# Patient Record
Sex: FEMALE | Race: BLACK OR AFRICAN AMERICAN | Employment: FULL TIME | URBAN - METROPOLITAN AREA
[De-identification: names, ages, dates, MRNs, and addresses within clinical notes are randomized per-mention and may not be internally consistent; named-entity substitution may affect disease eponyms.]

---

## 2019-11-03 ENCOUNTER — HOSPITAL ENCOUNTER (EMERGENCY)
Age: 29
Discharge: HOME | End: 2019-11-03
Payer: COMMERCIAL

## 2019-11-03 VITALS
HEIGHT: 68 IN | DIASTOLIC BLOOD PRESSURE: 79 MMHG | HEART RATE: 88 BPM | TEMPERATURE: 98 F | RESPIRATION RATE: 18 BRPM | SYSTOLIC BLOOD PRESSURE: 119 MMHG | OXYGEN SATURATION: 98 % | BODY MASS INDEX: 27.28 KG/M2 | WEIGHT: 180 LBS

## 2019-11-03 DIAGNOSIS — J01.00 ACUTE NON-RECURRENT MAXILLARY SINUSITIS: Primary | ICD-10-CM

## 2019-11-03 PROCEDURE — 99282 EMERGENCY DEPT VISIT SF MDM: CPT

## 2019-11-03 RX ORDER — AMOXICILLIN AND CLAVULANATE POTASSIUM 875; 125 MG/1; MG/1
1 TABLET, FILM COATED ORAL 2 TIMES DAILY
Qty: 20 TABLET | Refills: 0 | Status: SHIPPED | OUTPATIENT
Start: 2019-11-03 | End: 2019-11-13

## 2019-11-03 SDOH — HEALTH STABILITY: MENTAL HEALTH: HOW OFTEN DO YOU HAVE A DRINK CONTAINING ALCOHOL?: NEVER

## 2019-11-03 ASSESSMENT — ENCOUNTER SYMPTOMS
WHEEZING: 0
ABDOMINAL PAIN: 0
TROUBLE SWALLOWING: 0
SINUS PRESSURE: 1
SORE THROAT: 0
COUGH: 0
DIARRHEA: 0
NAUSEA: 0
BACK PAIN: 0
SHORTNESS OF BREATH: 0
RHINORRHEA: 1
VOMITING: 0

## 2020-09-23 ENCOUNTER — HOSPITAL ENCOUNTER (OUTPATIENT)
Dept: ULTRASOUND IMAGING | Age: 30
Discharge: HOME | End: 2020-09-25
Payer: COMMERCIAL

## 2020-09-23 PROCEDURE — 76801 OB US < 14 WKS SINGLE FETUS: CPT

## 2020-09-23 PROCEDURE — 76802 OB US < 14 WKS ADDL FETUS: CPT

## 2020-11-11 ENCOUNTER — APPOINTMENT (OUTPATIENT)
Dept: ULTRASOUND IMAGING | Age: 30
End: 2020-11-11
Payer: COMMERCIAL

## 2020-11-11 ENCOUNTER — HOSPITAL ENCOUNTER (OUTPATIENT)
Age: 30
Discharge: HOME | End: 2020-11-11
Attending: OBSTETRICS & GYNECOLOGY | Admitting: OBSTETRICS & GYNECOLOGY
Payer: COMMERCIAL

## 2020-11-11 VITALS
TEMPERATURE: 99.2 F | DIASTOLIC BLOOD PRESSURE: 55 MMHG | RESPIRATION RATE: 18 BRPM | HEART RATE: 102 BPM | SYSTOLIC BLOOD PRESSURE: 98 MMHG

## 2020-11-11 LAB
ALBUMIN SERPL-MCNC: 3.4 G/DL (ref 3.5–4.6)
ALP BLD-CCNC: 67 U/L (ref 40–130)
ALT SERPL-CCNC: 14 U/L (ref 0–33)
AMPHETAMINE SCREEN, URINE: ABNORMAL
ANION GAP SERPL CALCULATED.3IONS-SCNC: 10 MEQ/L (ref 9–15)
AST SERPL-CCNC: 18 U/L (ref 0–35)
BACTERIA: ABNORMAL /HPF
BARBITURATE SCREEN URINE: ABNORMAL
BASOPHILS ABSOLUTE: 0 K/UL (ref 0–0.2)
BASOPHILS RELATIVE PERCENT: 0.1 %
BENZODIAZEPINE SCREEN, URINE: ABNORMAL
BILIRUB SERPL-MCNC: 1.3 MG/DL (ref 0.2–0.7)
BILIRUBIN URINE: NEGATIVE
BLOOD, URINE: ABNORMAL
BUN BLDV-MCNC: 4 MG/DL (ref 6–20)
CALCIUM SERPL-MCNC: 8.9 MG/DL (ref 8.5–9.9)
CANNABINOID SCREEN URINE: POSITIVE
CHLORIDE BLD-SCNC: 104 MEQ/L (ref 95–107)
CLARITY: ABNORMAL
CLUE CELLS: ABNORMAL
CO2: 20 MEQ/L (ref 20–31)
COCAINE METABOLITE SCREEN URINE: ABNORMAL
COLOR: ABNORMAL
CREAT SERPL-MCNC: 0.48 MG/DL (ref 0.5–0.9)
EOSINOPHILS ABSOLUTE: 0 K/UL (ref 0–0.7)
EOSINOPHILS RELATIVE PERCENT: 0.1 %
EPITHELIAL CELLS, UA: ABNORMAL /HPF (ref 0–5)
GFR AFRICAN AMERICAN: >60
GFR NON-AFRICAN AMERICAN: >60
GLOBULIN: 2.9 G/DL (ref 2.3–3.5)
GLUCOSE BLD-MCNC: 98 MG/DL (ref 70–99)
GLUCOSE URINE: NEGATIVE MG/DL
HCT VFR BLD CALC: 31.5 % (ref 37–47)
HEMOGLOBIN: 10.5 G/DL (ref 12–16)
HYALINE CASTS: ABNORMAL /HPF (ref 0–5)
KETONES, URINE: >=80 MG/DL
LEUKOCYTE ESTERASE, URINE: ABNORMAL
LYMPHOCYTES ABSOLUTE: 1 K/UL (ref 1–4.8)
LYMPHOCYTES RELATIVE PERCENT: 9.3 %
Lab: ABNORMAL
MCH RBC QN AUTO: 33.9 PG (ref 27–31.3)
MCHC RBC AUTO-ENTMCNC: 33.3 % (ref 33–37)
MCV RBC AUTO: 101.8 FL (ref 82–100)
METHADONE SCREEN, URINE: ABNORMAL
MONOCYTES ABSOLUTE: 0.9 K/UL (ref 0.2–0.8)
MONOCYTES RELATIVE PERCENT: 8.5 %
NEUTROPHILS ABSOLUTE: 9 K/UL (ref 1.4–6.5)
NEUTROPHILS RELATIVE PERCENT: 82 %
NITRITE, URINE: NEGATIVE
OPIATE SCREEN URINE: ABNORMAL
OXYCODONE URINE: ABNORMAL
PDW BLD-RTO: 13.4 % (ref 11.5–14.5)
PH UA: 6 (ref 5–9)
PHENCYCLIDINE SCREEN URINE: ABNORMAL
PLATELET # BLD: 259 K/UL (ref 130–400)
POTASSIUM SERPL-SCNC: 3.4 MEQ/L (ref 3.4–4.9)
PROPOXYPHENE SCREEN: ABNORMAL
PROTEIN UA: 100 MG/DL
RBC # BLD: 3.09 M/UL (ref 4.2–5.4)
RBC UA: ABNORMAL /HPF (ref 0–5)
SODIUM BLD-SCNC: 134 MEQ/L (ref 135–144)
SPECIFIC GRAVITY UA: 1.01 (ref 1–1.03)
TOTAL PROTEIN: 6.3 G/DL (ref 6.3–8)
TRICHOMONAS PREP: ABNORMAL
TRICHOMONAS VAGINALIS SCREEN: NEGATIVE
UROBILINOGEN, URINE: 0.2 E.U./DL
WBC # BLD: 11 K/UL (ref 4.8–10.8)
WBC UA: >100 /HPF (ref 0–5)
YEAST WET PREP: ABNORMAL

## 2020-11-11 PROCEDURE — 99284 EMERGENCY DEPT VISIT MOD MDM: CPT

## 2020-11-11 PROCEDURE — 87186 SC STD MICRODIL/AGAR DIL: CPT

## 2020-11-11 PROCEDURE — 85025 COMPLETE CBC W/AUTO DIFF WBC: CPT

## 2020-11-11 PROCEDURE — 36415 COLL VENOUS BLD VENIPUNCTURE: CPT

## 2020-11-11 PROCEDURE — 87210 SMEAR WET MOUNT SALINE/INK: CPT

## 2020-11-11 PROCEDURE — 76815 OB US LIMITED FETUS(S): CPT

## 2020-11-11 PROCEDURE — 87077 CULTURE AEROBIC IDENTIFY: CPT

## 2020-11-11 PROCEDURE — 80053 COMPREHEN METABOLIC PANEL: CPT

## 2020-11-11 PROCEDURE — 99283 EMERGENCY DEPT VISIT LOW MDM: CPT | Performed by: OBSTETRICS & GYNECOLOGY

## 2020-11-11 PROCEDURE — 6370000000 HC RX 637 (ALT 250 FOR IP): Performed by: OBSTETRICS & GYNECOLOGY

## 2020-11-11 PROCEDURE — 87086 URINE CULTURE/COLONY COUNT: CPT

## 2020-11-11 PROCEDURE — 80307 DRUG TEST PRSMV CHEM ANLYZR: CPT

## 2020-11-11 PROCEDURE — 96365 THER/PROPH/DIAG IV INF INIT: CPT

## 2020-11-11 PROCEDURE — 2580000003 HC RX 258: Performed by: OBSTETRICS & GYNECOLOGY

## 2020-11-11 PROCEDURE — 6360000002 HC RX W HCPCS: Performed by: OBSTETRICS & GYNECOLOGY

## 2020-11-11 PROCEDURE — 87808 TRICHOMONAS ASSAY W/OPTIC: CPT

## 2020-11-11 PROCEDURE — 76817 TRANSVAGINAL US OBSTETRIC: CPT

## 2020-11-11 PROCEDURE — 76775 US EXAM ABDO BACK WALL LIM: CPT

## 2020-11-11 PROCEDURE — 81001 URINALYSIS AUTO W/SCOPE: CPT

## 2020-11-11 RX ORDER — ACETAMINOPHEN 325 MG/1
650 TABLET ORAL EVERY 4 HOURS PRN
Status: DISCONTINUED | OUTPATIENT
Start: 2020-11-11 | End: 2020-11-11 | Stop reason: HOSPADM

## 2020-11-11 RX ORDER — SODIUM CHLORIDE, SODIUM LACTATE, POTASSIUM CHLORIDE, CALCIUM CHLORIDE 600; 310; 30; 20 MG/100ML; MG/100ML; MG/100ML; MG/100ML
INJECTION, SOLUTION INTRAVENOUS CONTINUOUS
Status: DISCONTINUED | OUTPATIENT
Start: 2020-11-11 | End: 2020-11-11 | Stop reason: HOSPADM

## 2020-11-11 RX ORDER — NITROFURANTOIN 25; 75 MG/1; MG/1
100 CAPSULE ORAL 2 TIMES DAILY
Qty: 20 CAPSULE | Refills: 0 | Status: SHIPPED | OUTPATIENT
Start: 2020-11-11 | End: 2020-11-21

## 2020-11-11 RX ORDER — PROMETHAZINE HYDROCHLORIDE 12.5 MG/1
12.5 TABLET ORAL EVERY 6 HOURS PRN
Status: DISCONTINUED | OUTPATIENT
Start: 2020-11-11 | End: 2020-11-11 | Stop reason: HOSPADM

## 2020-11-11 RX ORDER — ONDANSETRON 2 MG/ML
4 INJECTION INTRAMUSCULAR; INTRAVENOUS EVERY 6 HOURS PRN
Status: DISCONTINUED | OUTPATIENT
Start: 2020-11-11 | End: 2020-11-11 | Stop reason: HOSPADM

## 2020-11-11 RX ORDER — SODIUM CHLORIDE 0.9 % (FLUSH) 0.9 %
10 SYRINGE (ML) INJECTION EVERY 12 HOURS SCHEDULED
Status: DISCONTINUED | OUTPATIENT
Start: 2020-11-11 | End: 2020-11-11 | Stop reason: HOSPADM

## 2020-11-11 RX ORDER — SODIUM CHLORIDE 0.9 % (FLUSH) 0.9 %
10 SYRINGE (ML) INJECTION PRN
Status: DISCONTINUED | OUTPATIENT
Start: 2020-11-11 | End: 2020-11-11 | Stop reason: HOSPADM

## 2020-11-11 RX ORDER — ASPIRIN 81 MG/1
81 TABLET, CHEWABLE ORAL DAILY
Status: ON HOLD | COMMUNITY
End: 2021-02-22 | Stop reason: HOSPADM

## 2020-11-11 RX ADMIN — SODIUM CHLORIDE, POTASSIUM CHLORIDE, SODIUM LACTATE AND CALCIUM CHLORIDE 999 ML/HR: 600; 310; 30; 20 INJECTION, SOLUTION INTRAVENOUS at 11:11

## 2020-11-11 RX ADMIN — CEFAZOLIN 1 G: 1 INJECTION, POWDER, FOR SOLUTION INTRAMUSCULAR; INTRAVENOUS at 11:11

## 2020-11-11 RX ADMIN — ACETAMINOPHEN 650 MG: 325 TABLET, FILM COATED ORAL at 11:11

## 2020-11-11 ASSESSMENT — PAIN SCALES - GENERAL: PAINLEVEL_OUTOF10: 8

## 2020-11-11 NOTE — FLOWSHEET NOTE
Dr. Ignacio Block currently on phone with Dr. Pamila Epley - has reviewed all labs. Pain remains at \"6\", patient sleeping and wakes when nurse enters room.

## 2020-11-11 NOTE — ED TRIAGE NOTES
Department of Obstetrics and Gynecology  Labor and Delivery   Triage Note      Pt Name: Thea Alvarado  MRN: [id number] Rob #: [de-identified]  YOB: 1990  Procedure Performed By: José Miguel Cannon MD        SUBJECTIVE:  32 y/o  female with twin gest at 22 wks who presents with lower abdominal pain for past week; bilateral, but worse on right side; prior ob hx/o 3 FT vag del; being seen at [facility] and had last ob sono at [place] on 10/13/20 with normal anatomy screen per [facility]; was rx'd with course of PCN in early pregnancy secondary to + GBS urine culture; denies any prior issue with kidney stones; 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 ROS-no dysuria, vag bleeding or ROM. OBJECTIVE  She is at 19 weeks gestation   Vitals:  /75   Pulse 107   Temp 99.2 °F (37.3 °C) (Oral)   Resp 18   LMP 2020     CONSTITUTIONAL:  awake, alert, cooperative, no apparent distress, and appears stated age  ABDOMEN:  Uterus gravid to 20-22 wks gest ( supple ). GENITAL/URINARY:  No labial lesions. Speculum: normal d/c of pregnancy ( wet prep done ). Cervix:             Dilation:  Closed         Effacement:  Long         Station:  -3 cm         Consistency:  firm         Position:  mid    Fetal Position:  Unknown. Membranes:  Intact    NST is reactive    Fetal heart rate:         Baseline Heart Rate:  RN attempted doppler of FHT's, but infants too active ( will obtain ob sono to confirm ).             Contraction frequency: 0 minutes        ASSESSMENT & PLAN:    Twin gest at 23 wks ( diamniotic, dichorionic ), ; c/o lower abdominal pain with right sided predominance-ua suspicious for severe uti with large blood and ketones; will check cbc, ua and comp panel; will hydrate with LR and give ancef, 1 gm ivpb stat ( uc pending ); ua shows severe uti; patient is afebrile and WBC is 11; Hgb-10.5; Plt-259; chemistries-ok; ob sono shows appropriate interval gwth for twins and maternal

## 2020-11-11 NOTE — FLOWSHEET NOTE
Dr. Heather Sheldon at bedside. Plan of care and labs explained to patient. Verbalized understanding.

## 2020-11-11 NOTE — FLOWSHEET NOTE
Wet prep obtained by Dr Ysabel Bush with spec exam, SVE done by Dr. Ysabel Bush. Cervix is closed.  Labs drawn by nurse per order

## 2020-11-11 NOTE — FLOWSHEET NOTE
Patient was offered twice  by Dr. Irina Rojas to stay if she felt she needed to - patient refused saying she wanted to go home. Dr. Irina Rojas explained Macrobid purpose and route to patient. Verbalized understanding.  Also emphasized patient is not  to work on Friday and must see her doctor that day. Patient verbalized understanding.

## 2020-11-11 NOTE — DISCHARGE INSTR - ACTIVITY
Follow up on Friday at [place] [id number] ON Friday - DOCTORS ORDERS. YOU MUST SEE DOCTOR ON Friday. Call or return to L&D if pain continues or you have any concerns.

## 2020-11-12 NOTE — FLOWSHEET NOTE
Dr. Radhika Schilling aware nurse unable to dopple twins. Babies are very active and she is 19 weeks. Will send patient down for u/s.

## 2020-11-13 LAB
ORGANISM: ABNORMAL
URINE CULTURE, ROUTINE: ABNORMAL

## 2021-02-12 ENCOUNTER — APPOINTMENT (OUTPATIENT)
Dept: ULTRASOUND IMAGING | Age: 31
End: 2021-02-12
Payer: COMMERCIAL

## 2021-02-12 ENCOUNTER — HOSPITAL ENCOUNTER (OUTPATIENT)
Age: 31
Discharge: HOME | End: 2021-02-13
Attending: OBSTETRICS & GYNECOLOGY | Admitting: OBSTETRICS & GYNECOLOGY
Payer: COMMERCIAL

## 2021-02-12 LAB
ALBUMIN SERPL-MCNC: 2.8 G/DL (ref 3.5–4.6)
ALP BLD-CCNC: 221 U/L (ref 40–130)
ALT SERPL-CCNC: <5 U/L (ref 0–33)
AMPHETAMINE SCREEN, URINE: ABNORMAL
ANION GAP SERPL CALCULATED.3IONS-SCNC: 9 MEQ/L (ref 9–15)
AST SERPL-CCNC: 21 U/L (ref 0–35)
BACTERIA: ABNORMAL /HPF
BARBITURATE SCREEN URINE: ABNORMAL
BASOPHILS ABSOLUTE: 0 K/UL (ref 0–0.2)
BASOPHILS RELATIVE PERCENT: 0.4 %
BENZODIAZEPINE SCREEN, URINE: ABNORMAL
BILIRUB SERPL-MCNC: 0.7 MG/DL (ref 0.2–0.7)
BILIRUBIN URINE: NEGATIVE
BLOOD, URINE: NEGATIVE
BUN BLDV-MCNC: 5 MG/DL (ref 6–20)
CALCIUM SERPL-MCNC: 8.5 MG/DL (ref 8.5–9.9)
CANNABINOID SCREEN URINE: POSITIVE
CHLORIDE BLD-SCNC: 107 MEQ/L (ref 95–107)
CLARITY: CLEAR
CO2: 17 MEQ/L (ref 20–31)
COCAINE METABOLITE SCREEN URINE: ABNORMAL
COLOR: YELLOW
CREAT SERPL-MCNC: 0.5 MG/DL (ref 0.5–0.9)
CREATININE URINE: 59.4 MG/DL
EOSINOPHILS ABSOLUTE: 0 K/UL (ref 0–0.7)
EOSINOPHILS RELATIVE PERCENT: 1 %
EPITHELIAL CELLS, UA: ABNORMAL /HPF (ref 0–5)
GFR AFRICAN AMERICAN: >60
GFR NON-AFRICAN AMERICAN: >60
GLOBULIN: 3.6 G/DL (ref 2.3–3.5)
GLUCOSE BLD-MCNC: 82 MG/DL (ref 70–99)
GLUCOSE URINE: NEGATIVE MG/DL
HCT VFR BLD CALC: 31.7 % (ref 37–47)
HEMOGLOBIN: 10.3 G/DL (ref 12–16)
HYALINE CASTS: ABNORMAL /HPF (ref 0–5)
KETONES, URINE: NEGATIVE MG/DL
LEUKOCYTE ESTERASE, URINE: ABNORMAL
LYMPHOCYTES ABSOLUTE: 1.8 K/UL (ref 1–4.8)
LYMPHOCYTES RELATIVE PERCENT: 35.4 %
Lab: ABNORMAL
MCH RBC QN AUTO: 32.5 PG (ref 27–31.3)
MCHC RBC AUTO-ENTMCNC: 32.5 % (ref 33–37)
MCV RBC AUTO: 100 FL (ref 82–100)
METHADONE SCREEN, URINE: ABNORMAL
MONOCYTES ABSOLUTE: 0.5 K/UL (ref 0.2–0.8)
MONOCYTES RELATIVE PERCENT: 10.6 %
NEUTROPHILS ABSOLUTE: 2.6 K/UL (ref 1.4–6.5)
NEUTROPHILS RELATIVE PERCENT: 52.6 %
NITRITE, URINE: NEGATIVE
OPIATE SCREEN URINE: ABNORMAL
OXYCODONE URINE: ABNORMAL
PDW BLD-RTO: 14.1 % (ref 11.5–14.5)
PH UA: 7 (ref 5–9)
PHENCYCLIDINE SCREEN URINE: ABNORMAL
PLATELET # BLD: 184 K/UL (ref 130–400)
POTASSIUM SERPL-SCNC: 4 MEQ/L (ref 3.4–4.9)
PROPOXYPHENE SCREEN: ABNORMAL
PROTEIN PROTEIN: 17 MG/DL
PROTEIN UA: NEGATIVE MG/DL
PROTEIN/CREAT RATIO: 0.3 ML/ML
PROTEIN/CREAT RATIO: 0.3 ML/ML (ref 0–0.2)
RBC # BLD: 3.17 M/UL (ref 4.2–5.4)
RBC UA: ABNORMAL /HPF (ref 0–5)
SARS-COV-2, NAAT: NOT DETECTED
SODIUM BLD-SCNC: 133 MEQ/L (ref 135–144)
SPECIFIC GRAVITY UA: 1.01 (ref 1–1.03)
TOTAL PROTEIN: 6.4 G/DL (ref 6.3–8)
URIC ACID, SERUM: 4.9 MG/DL (ref 2.4–5.7)
UROBILINOGEN, URINE: 0.2 E.U./DL
WBC # BLD: 5 K/UL (ref 4.8–10.8)
WBC UA: ABNORMAL /HPF (ref 0–5)

## 2021-02-12 PROCEDURE — 80307 DRUG TEST PRSMV CHEM ANLYZR: CPT

## 2021-02-12 PROCEDURE — 36415 COLL VENOUS BLD VENIPUNCTURE: CPT

## 2021-02-12 PROCEDURE — 81001 URINALYSIS AUTO W/SCOPE: CPT

## 2021-02-12 PROCEDURE — 96376 TX/PRO/DX INJ SAME DRUG ADON: CPT

## 2021-02-12 PROCEDURE — 6360000002 HC RX W HCPCS: Performed by: OBSTETRICS & GYNECOLOGY

## 2021-02-12 PROCEDURE — 96367 TX/PROPH/DG ADDL SEQ IV INF: CPT

## 2021-02-12 PROCEDURE — 80053 COMPREHEN METABOLIC PANEL: CPT

## 2021-02-12 PROCEDURE — 84550 ASSAY OF BLOOD/URIC ACID: CPT

## 2021-02-12 PROCEDURE — 2580000003 HC RX 258: Performed by: OBSTETRICS & GYNECOLOGY

## 2021-02-12 PROCEDURE — 96366 THER/PROPH/DIAG IV INF ADDON: CPT

## 2021-02-12 PROCEDURE — 85025 COMPLETE CBC W/AUTO DIFF WBC: CPT

## 2021-02-12 PROCEDURE — U0002 COVID-19 LAB TEST NON-CDC: HCPCS

## 2021-02-12 PROCEDURE — 99221 1ST HOSP IP/OBS SF/LOW 40: CPT | Performed by: OBSTETRICS & GYNECOLOGY

## 2021-02-12 PROCEDURE — 84156 ASSAY OF PROTEIN URINE: CPT

## 2021-02-12 PROCEDURE — 96375 TX/PRO/DX INJ NEW DRUG ADDON: CPT

## 2021-02-12 PROCEDURE — 59025 FETAL NON-STRESS TEST: CPT | Performed by: OBSTETRICS & GYNECOLOGY

## 2021-02-12 PROCEDURE — 96368 THER/DIAG CONCURRENT INF: CPT

## 2021-02-12 PROCEDURE — 76815 OB US LIMITED FETUS(S): CPT

## 2021-02-12 PROCEDURE — 6370000000 HC RX 637 (ALT 250 FOR IP): Performed by: OBSTETRICS & GYNECOLOGY

## 2021-02-12 PROCEDURE — 96372 THER/PROPH/DIAG INJ SC/IM: CPT

## 2021-02-12 PROCEDURE — 96365 THER/PROPH/DIAG IV INF INIT: CPT

## 2021-02-12 RX ORDER — OXYCODONE HYDROCHLORIDE AND ACETAMINOPHEN 5; 325 MG/1; MG/1
1 TABLET ORAL EVERY 4 HOURS PRN
Status: DISCONTINUED | OUTPATIENT
Start: 2021-02-12 | End: 2021-02-13 | Stop reason: HOSPADM

## 2021-02-12 RX ORDER — TERBUTALINE SULFATE 1 MG/ML
0.25 INJECTION, SOLUTION SUBCUTANEOUS ONCE
Status: COMPLETED | OUTPATIENT
Start: 2021-02-12 | End: 2021-02-12

## 2021-02-12 RX ORDER — NIFEDIPINE 10 MG/1
20 CAPSULE ORAL EVERY 4 HOURS
Status: DISCONTINUED | OUTPATIENT
Start: 2021-02-12 | End: 2021-02-13 | Stop reason: HOSPADM

## 2021-02-12 RX ORDER — TERBUTALINE SULFATE 1 MG/ML
0.25 INJECTION, SOLUTION SUBCUTANEOUS
Status: ACTIVE | OUTPATIENT
Start: 2021-02-12 | End: 2021-02-12

## 2021-02-12 RX ORDER — PROMETHAZINE HYDROCHLORIDE 12.5 MG/1
12.5 TABLET ORAL EVERY 6 HOURS PRN
Status: DISCONTINUED | OUTPATIENT
Start: 2021-02-12 | End: 2021-02-13 | Stop reason: HOSPADM

## 2021-02-12 RX ORDER — ONDANSETRON 2 MG/ML
4 INJECTION INTRAMUSCULAR; INTRAVENOUS EVERY 6 HOURS PRN
Status: DISCONTINUED | OUTPATIENT
Start: 2021-02-12 | End: 2021-02-13 | Stop reason: HOSPADM

## 2021-02-12 RX ORDER — OXYCODONE HYDROCHLORIDE AND ACETAMINOPHEN 5; 325 MG/1; MG/1
2 TABLET ORAL EVERY 4 HOURS PRN
Status: DISCONTINUED | OUTPATIENT
Start: 2021-02-12 | End: 2021-02-13 | Stop reason: HOSPADM

## 2021-02-12 RX ORDER — ACETAMINOPHEN 325 MG/1
650 TABLET ORAL EVERY 4 HOURS PRN
Status: DISCONTINUED | OUTPATIENT
Start: 2021-02-12 | End: 2021-02-13 | Stop reason: HOSPADM

## 2021-02-12 RX ORDER — SODIUM CHLORIDE 9 MG/ML
INJECTION, SOLUTION INTRAVENOUS CONTINUOUS
Status: DISCONTINUED | OUTPATIENT
Start: 2021-02-12 | End: 2021-02-13 | Stop reason: HOSPADM

## 2021-02-12 RX ORDER — MAGNESIUM SULFATE IN WATER 40 MG/ML
4000 INJECTION, SOLUTION INTRAVENOUS ONCE
Status: COMPLETED | OUTPATIENT
Start: 2021-02-12 | End: 2021-02-12

## 2021-02-12 RX ORDER — SODIUM CHLORIDE 9 MG/ML
INJECTION, SOLUTION INTRAVENOUS ONCE
Status: COMPLETED | OUTPATIENT
Start: 2021-02-12 | End: 2021-02-12

## 2021-02-12 RX ORDER — BETAMETHASONE SODIUM PHOSPHATE AND BETAMETHASONE ACETATE 3; 3 MG/ML; MG/ML
12 INJECTION, SUSPENSION INTRA-ARTICULAR; INTRALESIONAL; INTRAMUSCULAR; SOFT TISSUE DAILY
Status: COMPLETED | OUTPATIENT
Start: 2021-02-12 | End: 2021-02-13

## 2021-02-12 RX ORDER — ZOLPIDEM TARTRATE 5 MG/1
5 TABLET ORAL NIGHTLY PRN
Status: DISCONTINUED | OUTPATIENT
Start: 2021-02-12 | End: 2021-02-13 | Stop reason: HOSPADM

## 2021-02-12 RX ADMIN — MAGNESIUM SULFATE HEPTAHYDRATE 4000 MG: 40 INJECTION, SOLUTION INTRAVENOUS at 12:22

## 2021-02-12 RX ADMIN — NIFEDIPINE 20 MG: 10 CAPSULE ORAL at 21:40

## 2021-02-12 RX ADMIN — MAGNESIUM SULFATE HEPTAHYDRATE 2 G/HR: 40 INJECTION, SOLUTION INTRAVENOUS at 23:11

## 2021-02-12 RX ADMIN — TERBUTALINE SULFATE 0.25 MG: 1 INJECTION SUBCUTANEOUS at 11:06

## 2021-02-12 RX ADMIN — ZOLPIDEM TARTRATE 5 MG: 5 TABLET ORAL at 21:40

## 2021-02-12 RX ADMIN — CEFAZOLIN 1000 MG: 1 INJECTION, POWDER, FOR SOLUTION INTRAMUSCULAR; INTRAVENOUS at 11:08

## 2021-02-12 RX ADMIN — BETAMETHASONE SODIUM PHOSPHATE AND BETAMETHASONE ACETATE 12 MG: 3; 3 INJECTION, SUSPENSION INTRA-ARTICULAR; INTRALESIONAL; INTRAMUSCULAR at 11:03

## 2021-02-12 RX ADMIN — SODIUM CHLORIDE: 9 INJECTION, SOLUTION INTRAVENOUS at 10:24

## 2021-02-12 RX ADMIN — CEFAZOLIN 1000 MG: 1 INJECTION, POWDER, FOR SOLUTION INTRAMUSCULAR; INTRAVENOUS at 19:15

## 2021-02-12 RX ADMIN — ONDANSETRON 4 MG: 2 INJECTION INTRAMUSCULAR; INTRAVENOUS at 23:10

## 2021-02-12 RX ADMIN — SODIUM CHLORIDE 125 ML/HR: 9 INJECTION, SOLUTION INTRAVENOUS at 12:25

## 2021-02-12 RX ADMIN — ONDANSETRON 4 MG: 2 INJECTION INTRAMUSCULAR; INTRAVENOUS at 11:19

## 2021-02-12 RX ADMIN — SODIUM CHLORIDE: 9 INJECTION, SOLUTION INTRAVENOUS at 23:10

## 2021-02-12 RX ADMIN — MAGNESIUM SULFATE HEPTAHYDRATE 2 G/HR: 40 INJECTION, SOLUTION INTRAVENOUS at 12:42

## 2021-02-12 RX ADMIN — ACETAMINOPHEN 650 MG: 325 TABLET ORAL at 17:13

## 2021-02-12 ASSESSMENT — PAIN SCALES - GENERAL: PAINLEVEL_OUTOF10: 2

## 2021-02-12 NOTE — FLOWSHEET NOTE
Dr. William Medina at the desk. States he will call Dr. Yaa Carlos to left him know the patient is here.

## 2021-02-12 NOTE — PLAN OF CARE
Problem: Cardiac:  Goal: Ability to maintain vital signs within normal range will improve  Outcome: Ongoing     Problem: Physical Regulation:  Goal: Complications related to the disease process, condition or treatment will be avoided or minimized  Outcome: Ongoing     Problem: Pain:  Goal: Pain level will decrease  Outcome: Ongoing  Goal: Control of acute pain  Outcome: Ongoing  Goal: Control of chronic pain  Outcome: Ongoing     Problem: Physical Regulation:  Goal: Complications related to the disease process, condition or treatment will be avoided or minimized  Outcome: Ongoing

## 2021-02-12 NOTE — FLOWSHEET NOTE
Pt to L&D via wheelchair with c/o contractions since yesterday. Pt instructed on clean catch urine sample needed.

## 2021-02-12 NOTE — H&P
Labor and Delivery         CHIEF COMPLAINT:  abdominal pain, contractions    HISTORY OF PRESENT ILLNESS:      The patient is a 27 y.o. female  30w10d. OB History        4    Para   3    Term   3       0    AB   0    Living   3       SAB   0    TAB   0    Ectopic   0    Molar   0    Multiple   0    Live Births   3              Patient presents complaining of  abdominal pain, contractions  She reports Normal fetal movement. She denies vaginal bleeding. She denies leakage of amniotic fluid     Estimated Due Date:  Estimated Date of Delivery: 21    PAST MEDICAL HISTORY: History reviewed. No pertinent past medical history. 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 She has never used smokeless tobacco. She reports that she does not drink alcohol or use drugs. MEDICATIONS:    Prior to Admission medications    Medication Sig Start Date End Date Taking? Authorizing Provider   aspirin 81 MG chewable tablet Take 81 mg by mouth daily   Yes Historical Provider, MD   Prenatal Vit-DSS-Fe Cbn-FA (PRENATAL AD PO) Take  by mouth. Historical Provider, MD   ferrous sulfate 325 (65 FE) MG tablet Take 325 mg by mouth daily (with breakfast). Historical Provider, MD        FAMILY HISTORY: History reviewed. No pertinent family history. DRUG ALLERGIES: Patient has no known allergies. PRENATAL CARE:   Complicated by: twin gestation,     REVIEW OF SYSTEMS:     Pertinent items are noted in HPI. PHYSICAL EXAM:    Vital Signs: Blood pressure (!) 143/88, pulse 74, temperature 98.6 °F (37 °C), temperature source Oral, resp. rate 18, height 5' 8\" (1.727 m), weight 212 lb 8 oz (96.4 kg), last menstrual period 2020.     CONSTITUTIONAL:  awake, alert, cooperative, no apparent distress, and appears stated age  LUNGS:  No increased work of breathing, good air exchange, clear to auscultation bilaterally, no crackles or wheezing  CARDIOVASCULAR:  Normal apical impulse, regular rate and rhythm, normal S1 and S2, no S3 or S4, and no murmur noted  ABDOMEN:  No scars, normal bowel sounds, soft, non-distended, non-tender, no masses palpated, no hepatosplenomegally  Cervix:  Cl/50%/ -3     Fetal heart rate on NST:  Baseline Heart Rate both in 130's bpm , accelerations:  Present in both twins .  CATEG 1 NST     Contraction frequency on tocometer:  regular, every 5 minutes  Membranes:  Intact    General Labs:      Admission on 02/12/2021   Component Date Value Ref Range Status    Color, UA 02/12/2021 Yellow  Straw/Yellow Final    Clarity, UA 02/12/2021 Clear  Clear Final    Glucose, Ur 02/12/2021 Negative  Negative mg/dL Final    Bilirubin Urine 02/12/2021 Negative  Negative Final    Ketones, Urine 02/12/2021 Negative  Negative mg/dL Final    Specific Gravity, UA 02/12/2021 1.008  1.005 - 1.030 Final    Blood, Urine 02/12/2021 Negative  Negative Final    pH, UA 02/12/2021 7.0  5.0 - 9.0 Final    Protein, UA 02/12/2021 Negative  Negative mg/dL Final    Urobilinogen, Urine 02/12/2021 0.2  <2.0 E.U./dL Final    Nitrite, Urine 02/12/2021 Negative  Negative Final    Leukocyte Esterase, Urine 02/12/2021 SMALL* Negative Final    Amphetamine Screen, Urine 02/12/2021 Neg  Negative <1000 ng/mL Final    Barbiturate Screen, Ur 02/12/2021 Neg  Negative < 200 ng/mL Final    Benzodiazepine Screen, Urine 02/12/2021 Neg  Negative < 200 ng/mL Final    Cannabinoid Scrn, Ur 02/12/2021 POSITIVE* Negative < 50 ng/mL Final    Cocaine Metabolite Screen, Urine 02/12/2021 Neg  Negative < 300 ng/mL Final    Opiate Scrn, Ur 02/12/2021 Neg  Negative < 300 ng/mL Final    PCP Screen, Urine 02/12/2021 Neg  Negative < 25 ng/mL Final    Methadone Screen, Urine 02/12/2021 Neg  Negative <300 ng/mL Final    Propoxyphene Scrn, Ur 02/12/2021 Neg  Negative <300 oxyCODONE-acetaminophen (PERCOCET) 5-325 MG per tablet 2 tablet       PLAN:  Admit for OBS   24 hr urine for TP and TC   Tocolysis with BMZ for FLM   continous monitoring   US   See orders above . D/W patient , all questions answered.      Kae Chowdhury MD  2/12/2021

## 2021-02-13 VITALS
SYSTOLIC BLOOD PRESSURE: 126 MMHG | TEMPERATURE: 98.1 F | WEIGHT: 212.5 LBS | HEIGHT: 68 IN | OXYGEN SATURATION: 95 % | BODY MASS INDEX: 32.21 KG/M2 | DIASTOLIC BLOOD PRESSURE: 78 MMHG | HEART RATE: 95 BPM | RESPIRATION RATE: 18 BRPM

## 2021-02-13 LAB
24HR URINE VOLUME (ML): 1400 ML
CREATININE 24 HOUR URINE: 1.64 G/TV (ref 0.8–1.5)
CREATININE URINE: 117.3 MG/DL
PROTEIN 24 HOUR URINE: 364 MG/TV (ref 0–200)

## 2021-02-13 PROCEDURE — 84156 ASSAY OF PROTEIN URINE: CPT

## 2021-02-13 PROCEDURE — 96376 TX/PRO/DX INJ SAME DRUG ADON: CPT

## 2021-02-13 PROCEDURE — 96372 THER/PROPH/DIAG INJ SC/IM: CPT

## 2021-02-13 PROCEDURE — 99284 EMERGENCY DEPT VISIT MOD MDM: CPT

## 2021-02-13 PROCEDURE — 6370000000 HC RX 637 (ALT 250 FOR IP): Performed by: OBSTETRICS & GYNECOLOGY

## 2021-02-13 PROCEDURE — 99217 PR OBSERVATION CARE DISCHARGE MANAGEMENT: CPT | Performed by: OBSTETRICS & GYNECOLOGY

## 2021-02-13 PROCEDURE — 96366 THER/PROPH/DIAG IV INF ADDON: CPT

## 2021-02-13 PROCEDURE — 6360000002 HC RX W HCPCS: Performed by: OBSTETRICS & GYNECOLOGY

## 2021-02-13 PROCEDURE — 2580000003 HC RX 258: Performed by: OBSTETRICS & GYNECOLOGY

## 2021-02-13 RX ORDER — NIFEDIPINE 30 MG/1
30 TABLET, EXTENDED RELEASE ORAL DAILY
Qty: 2 TABLET | Refills: 0 | Status: SHIPPED | OUTPATIENT
Start: 2021-02-13

## 2021-02-13 RX ORDER — CEPHALEXIN 500 MG/1
500 CAPSULE ORAL 4 TIMES DAILY
Qty: 30 CAPSULE | Refills: 0 | Status: ON HOLD | OUTPATIENT
Start: 2021-02-13 | End: 2021-02-22 | Stop reason: HOSPADM

## 2021-02-13 RX ADMIN — NIFEDIPINE 20 MG: 10 CAPSULE ORAL at 05:49

## 2021-02-13 RX ADMIN — ACETAMINOPHEN 650 MG: 325 TABLET ORAL at 01:56

## 2021-02-13 RX ADMIN — ACETAMINOPHEN 650 MG: 325 TABLET ORAL at 05:53

## 2021-02-13 RX ADMIN — BETAMETHASONE SODIUM PHOSPHATE AND BETAMETHASONE ACETATE 12 MG: 3; 3 INJECTION, SUSPENSION INTRA-ARTICULAR; INTRALESIONAL; INTRAMUSCULAR at 09:05

## 2021-02-13 RX ADMIN — MAGNESIUM SULFATE HEPTAHYDRATE 2 G/HR: 40 INJECTION, SOLUTION INTRAVENOUS at 09:19

## 2021-02-13 RX ADMIN — CEFAZOLIN 1000 MG: 1 INJECTION, POWDER, FOR SOLUTION INTRAMUSCULAR; INTRAVENOUS at 11:11

## 2021-02-13 RX ADMIN — CEFAZOLIN 1000 MG: 1 INJECTION, POWDER, FOR SOLUTION INTRAMUSCULAR; INTRAVENOUS at 03:30

## 2021-02-13 RX ADMIN — NIFEDIPINE 20 MG: 10 CAPSULE ORAL at 09:06

## 2021-02-13 RX ADMIN — NIFEDIPINE 20 MG: 10 CAPSULE ORAL at 01:57

## 2021-02-13 RX ADMIN — NIFEDIPINE 20 MG: 10 CAPSULE ORAL at 13:40

## 2021-02-13 NOTE — FLOWSHEET NOTE
Dr Derek Medrano notified of increased frequency of contractions 4-6 minutes apart and bolus given in past 30 minutes. Patient states contractions still same level of intensity but feeling more frequently. No new orders at this time.

## 2021-02-13 NOTE — FLOWSHEET NOTE
Patient complaint of discomfort from bailey catheter at this time. Catheter readjusted and balloon readjusted. Educated patient on importance of monitoring urine output and discussed other options such as removal of catheter at this time and ability to use bed pan. Patient satisfied at this time and states if discomfort returns, they will notify nurse to remove bailey catheter.

## 2021-02-13 NOTE — FLOWSHEET NOTE
Pt insisted she wanted bailey catheter removed. Informed pt if the magnesium would need to be restarted, a new bailey would need to be placed. Pt verbalized understanding.

## 2021-02-13 NOTE — DISCHARGE SUMMARY
Obstetric Discharge Summary    Admitting Diagnosis   contractions, twin gestation, hypertension  OB History        4    Para   3    Term   3       0    AB   0    Living   3       SAB   0    TAB   0    Ectopic   0    Molar   0    Multiple   0    Live Births   3                Reasons for Admission on 2021  9:32 AM  No admission diagnoses are documented for this encounter. No comment available  Preclampsia   Labor    Prenatal Procedures  Management of Obstetric Complications: Magnesium sulfate, Betamethasone, Ancef  Fetal monitoring: category 1     Intrapartum Procedures        Multiple birth?: yes          Postpartum Procedures  n/a    Postpartum/Operative Complications       [place] Data  This patient has no babies on file. Discharge n/a  Complications: No    Discharge Diagnosis       Discharge Information  Current Discharge Medication List      START taking these medications    Details   NIFEdipine (PROCARDIA XL) 30 MG extended release tablet Take 1 tablet by mouth daily  Qty: 2 tablet, Refills: 0      cephALEXin (KEFLEX) 500 MG capsule Take 1 capsule by mouth 4 times daily  Qty: 30 capsule, Refills: 0         CONTINUE these medications which have NOT CHANGED    Details   aspirin 81 MG chewable tablet Take 81 mg by mouth daily      Prenatal Vit-DSS-Fe Cbn-FA (PRENATAL AD PO) Take  by mouth. Associated Diagnoses: Hyperthyroidism; Goiter      ferrous sulfate 325 (65 FE) MG tablet Take 325 mg by mouth daily (with breakfast). Associated Diagnoses: Hyperthyroidism; Goiter             No discharge procedures on file. Discharge to: Home  [facility] in [facility]. Accompanied by mother.     Discharge Date:  Time: 2pm      Comments  26 yo  at 32+6 wks admit for  contractions and hypertension  S/p magnesium sulfate x 24 hr and Betamethsone  24 hour UP done - 364  Case d/w Dr. Yaa Carlos who recommends pt may be d/c home and return Monday  D/c home on Procardia and Keflex

## 2021-02-13 NOTE — FLOWSHEET NOTE
Discharge teaching and instructions done for both mother. Mother verbalizes understanding. Mother has no questions at this time.

## 2021-02-13 NOTE — FLOWSHEET NOTE
Dr. Valeriy Alston reviewing FHR strip. Ok to remove FHR monitors at this time while awaiting discharge.

## 2021-02-13 NOTE — FLOWSHEET NOTE
Dr Oleg Tena called and made aware of total  Protein/creatinine in 24 hr ,b/p's this morning. Orders received to discontinue the magnesiuim sulfate, observe for one hour, if b/p and no contractions conitnue may be discharged. House physician to evaluate the strip.

## 2021-02-13 NOTE — FLOWSHEET NOTE
Dr Milton Tena in room to see patient and discuss plan of care at this time. New orders for Procardia q 4 hours and ambien 5mg prn for sleep aid. Patient verbalizes understanding.

## 2021-02-13 NOTE — FLOWSHEET NOTE
Pt medicated with Terbutaline after being educated on medication action/possible side effects. Understanding verbalized by pt.

## 2021-02-13 NOTE — PROGRESS NOTES
Rey Pollard is a 27 y.o. female patient. No diagnosis found. History reviewed. No pertinent past medical history. OB History        4    Para   3    Term   3       0    AB   0    Living   3       SAB   0    TAB   0    Ectopic   0    Molar   0    Multiple   0    Live Births   3              32w6d  Estimated Date of Delivery: 21  No Known Allergies  Active Problems:    Twin gestation in third trimester    Elevated BP without diagnosis of hypertension     labor in third trimester without delivery  Resolved Problems:    * No resolved hospital problems. *    Blood pressure 126/78, pulse 95, temperature 98.1 °F (36.7 °C), temperature source Oral, resp. rate 18, height 5' 8\" (1.727 m), weight 212 lb 8 oz (96.4 kg), last menstrual period 2020, SpO2 95 %. Maternal Medical History:   Reason for admission: Contractions. Fetal activity: Perceived fetal activity is normal.      Prenatal complications: Twin pregnancy  Gestational hypertension        Maternal Exam:   Uterine Assessment: Contractions currently sparse     Abdomen: Patient reports no abdominal tenderness. Fetal Exam  Fetal State Assessment: Category I - tracings are normal.          Assessment:  Not in labor. Membrane status: intact.    Fetal well-being: normal.   28 yo  at 32+6 wks twin pregnancy admit for  contractions and elevated blood pressures  Positive UDS cannabis  UTI on admission     Plan:  Continue magnesium sulfate x 24 hrs  Continue procardia  24 hour urine protein pending  Second dose betamethasone pending  Continue abx   Continue close monitoring            Keaton Torrez DO  2021

## 2021-02-13 NOTE — FLOWSHEET NOTE
Note: From 12:15 to 19:00, please note that baby A is charted as baby B and Baby B is charted as Baby A.

## 2021-02-13 NOTE — PLAN OF CARE
Problem: Pain:  Goal: Pain level will decrease  Description: Pain level will decrease  2/13/2021 0525 by Reginaldo Sommers RN  Outcome: Ongoing  2/12/2021 1705 by Gladys Kwon RN  Outcome: Ongoing  Goal: Control of acute pain  Description: Control of acute pain  2/13/2021 0525 by Reginaldo Sommers RN  Outcome: Ongoing  2/12/2021 1705 by Gladys Kwon RN  Outcome: Ongoing  Goal: Control of chronic pain  Description: Control of chronic pain  2/13/2021 0525 by Reginaldo Sommers RN  Outcome: Ongoing  2/12/2021 1705 by Gladys Kwon RN  Outcome: Ongoing     Problem: Cardiac:  Goal: Ability to maintain vital signs within normal range will improve  Description: Ability to maintain vital signs within normal range will improve  2/13/2021 0525 by Reginaldo Sommers RN  Outcome: Ongoing  2/12/2021 1705 by Gladys Kwon RN  Outcome: Ongoing     Problem: Physical Regulation:  Goal: Complications related to the disease process, condition or treatment will be avoided or minimized  Description: Complications related to the disease process, condition or treatment will be avoided or minimized  2/13/2021 0525 by Reginaldo Sommers RN  Outcome: Ongoing  2/12/2021 1705 by Gladys Kwon RN  Outcome: Ongoing

## 2021-02-13 NOTE — H&P
Mario Angeles is a 27 y.o. female patient. No diagnosis found. History reviewed. No pertinent past medical history. OB History        4    Para   3    Term   3       0    AB   0    Living   3       SAB   0    TAB   0    Ectopic   0    Molar   0    Multiple   0    Live Births   3              32w6d  Estimated Date of Delivery: 21  No Known Allergies  Active Problems:    Twin gestation in third trimester    Elevated BP without diagnosis of hypertension     labor in third trimester without delivery  Resolved Problems:    * No resolved hospital problems. *    Blood pressure 122/70, pulse 96, temperature 97.9 °F (36.6 °C), temperature source Oral, resp. rate 16, height 5' 8\" (1.727 m), weight 212 lb 8 oz (96.4 kg), last menstrual period 2020, SpO2 97 %. Maternal Medical History:   Reason for admission: Contractions. Fetal activity: Perceived fetal activity is normal.      Prenatal complications: Twin pregnancy  Gestational hypertension        Maternal Exam:   Uterine Assessment: Contractions currently sparse     Abdomen: Patient reports no abdominal tenderness. Fetal Exam  Fetal State Assessment: Category I - tracings are normal.          Assessment:  Not in labor. Membrane status: intact.    Fetal well-being: normal.   26 yo  at 32+6 wks twin pregnancy admit for  contractions and elevated blood pressures  Positive UDS cannabis  UTI on admission     Plan:  Continue magnesium sulfate x 24 hrs  Continue procardia  24 hour urine protein pending  Second dose betamethasone pending  Continue abx   Continue close monitoring            Shayne Adams DO  2021 [facility]  Girish Ruiz M.D.  Bhavesh Del Real D.O.  Cathie Dias M.D.  Yudith Long, MSN, ANP-C    Telephone: [phone]  Facsimile: [phone]    [address] [place] [address]

## 2021-02-15 ENCOUNTER — APPOINTMENT (OUTPATIENT)
Dept: ULTRASOUND IMAGING | Age: 31
End: 2021-02-15
Payer: COMMERCIAL

## 2021-02-15 ENCOUNTER — HOSPITAL ENCOUNTER (OUTPATIENT)
Dept: LABOR AND DELIVERY | Age: 31
Discharge: HOME | End: 2021-02-15
Attending: OBSTETRICS & GYNECOLOGY | Admitting: OBSTETRICS & GYNECOLOGY
Payer: COMMERCIAL

## 2021-02-15 VITALS
WEIGHT: 220 LBS | HEIGHT: 68 IN | DIASTOLIC BLOOD PRESSURE: 60 MMHG | RESPIRATION RATE: 17 BRPM | HEART RATE: 94 BPM | TEMPERATURE: 97.8 F | BODY MASS INDEX: 33.34 KG/M2 | SYSTOLIC BLOOD PRESSURE: 123 MMHG

## 2021-02-15 LAB
ABO/RH: NORMAL
ALBUMIN SERPL-MCNC: 2.9 G/DL (ref 3.5–4.6)
ALP BLD-CCNC: 209 U/L (ref 40–130)
ALT SERPL-CCNC: 6 U/L (ref 0–33)
ANION GAP SERPL CALCULATED.3IONS-SCNC: 6 MEQ/L (ref 9–15)
ANTIBODY SCREEN: NORMAL
AST SERPL-CCNC: 22 U/L (ref 0–35)
BACTERIA: NEGATIVE /HPF
BASOPHILS ABSOLUTE: 0 K/UL (ref 0–0.2)
BASOPHILS RELATIVE PERCENT: 0.4 %
BILIRUB SERPL-MCNC: 0.7 MG/DL (ref 0.2–0.7)
BILIRUBIN URINE: NEGATIVE
BLOOD, URINE: NEGATIVE
BUN BLDV-MCNC: 12 MG/DL (ref 6–20)
CALCIUM SERPL-MCNC: 8.8 MG/DL (ref 8.5–9.9)
CHLORIDE BLD-SCNC: 108 MEQ/L (ref 95–107)
CLARITY: CLEAR
CO2: 22 MEQ/L (ref 20–31)
COLOR: YELLOW
CREAT SERPL-MCNC: 0.69 MG/DL (ref 0.5–0.9)
CREATININE URINE: 187.6 MG/DL
EOSINOPHILS ABSOLUTE: 0 K/UL (ref 0–0.7)
EOSINOPHILS RELATIVE PERCENT: 0.6 %
EPITHELIAL CELLS, UA: ABNORMAL /HPF (ref 0–5)
GFR AFRICAN AMERICAN: >60
GFR NON-AFRICAN AMERICAN: >60
GLOBULIN: 3.1 G/DL (ref 2.3–3.5)
GLUCOSE BLD-MCNC: 85 MG/DL (ref 70–99)
GLUCOSE URINE: NEGATIVE MG/DL
HCT VFR BLD CALC: 29 % (ref 37–47)
HEMOGLOBIN: 9.7 G/DL (ref 12–16)
HEPATITIS B SURFACE ANTIGEN INTERPRETATION: NORMAL
HYALINE CASTS: ABNORMAL /HPF (ref 0–5)
KETONES, URINE: NEGATIVE MG/DL
LEUKOCYTE ESTERASE, URINE: ABNORMAL
LYMPHOCYTES ABSOLUTE: 1.8 K/UL (ref 1–4.8)
LYMPHOCYTES RELATIVE PERCENT: 29.5 %
MCH RBC QN AUTO: 33.2 PG (ref 27–31.3)
MCHC RBC AUTO-ENTMCNC: 33.4 % (ref 33–37)
MCV RBC AUTO: 99.7 FL (ref 82–100)
MONOCYTES ABSOLUTE: 1 K/UL (ref 0.2–0.8)
MONOCYTES RELATIVE PERCENT: 15.8 %
NEUTROPHILS ABSOLUTE: 3.3 K/UL (ref 1.4–6.5)
NEUTROPHILS RELATIVE PERCENT: 53.7 %
NITRITE, URINE: NEGATIVE
PDW BLD-RTO: 14.4 % (ref 11.5–14.5)
PH UA: 6.5 (ref 5–9)
PLATELET # BLD: 195 K/UL (ref 130–400)
POTASSIUM SERPL-SCNC: 4.3 MEQ/L (ref 3.4–4.9)
PROTEIN PROTEIN: 33 MG/DL
PROTEIN UA: ABNORMAL MG/DL
PROTEIN/CREAT RATIO: 0.2 ML/ML
PROTEIN/CREAT RATIO: 0.2 ML/ML (ref 0–0.2)
RBC # BLD: 2.91 M/UL (ref 4.2–5.4)
RBC UA: ABNORMAL /HPF (ref 0–2)
SODIUM BLD-SCNC: 136 MEQ/L (ref 135–144)
SPECIFIC GRAVITY UA: 1.02 (ref 1–1.03)
TOTAL PROTEIN: 6 G/DL (ref 6.3–8)
UROBILINOGEN, URINE: 0.2 E.U./DL
WBC # BLD: 6.2 K/UL (ref 4.8–10.8)
WBC UA: ABNORMAL /HPF (ref 0–5)

## 2021-02-15 PROCEDURE — 76819 FETAL BIOPHYS PROFIL W/O NST: CPT

## 2021-02-15 PROCEDURE — 6360000002 HC RX W HCPCS: Performed by: OBSTETRICS & GYNECOLOGY

## 2021-02-15 PROCEDURE — 86901 BLOOD TYPING SEROLOGIC RH(D): CPT

## 2021-02-15 PROCEDURE — 86850 RBC ANTIBODY SCREEN: CPT

## 2021-02-15 PROCEDURE — 99215 OFFICE O/P EST HI 40 MIN: CPT | Performed by: OBSTETRICS & GYNECOLOGY

## 2021-02-15 PROCEDURE — 85025 COMPLETE CBC W/AUTO DIFF WBC: CPT

## 2021-02-15 PROCEDURE — 36415 COLL VENOUS BLD VENIPUNCTURE: CPT

## 2021-02-15 PROCEDURE — 84156 ASSAY OF PROTEIN URINE: CPT

## 2021-02-15 PROCEDURE — 59025 FETAL NON-STRESS TEST: CPT | Performed by: OBSTETRICS & GYNECOLOGY

## 2021-02-15 PROCEDURE — 6370000000 HC RX 637 (ALT 250 FOR IP): Performed by: OBSTETRICS & GYNECOLOGY

## 2021-02-15 PROCEDURE — 2580000003 HC RX 258: Performed by: OBSTETRICS & GYNECOLOGY

## 2021-02-15 PROCEDURE — 87340 HEPATITIS B SURFACE AG IA: CPT

## 2021-02-15 PROCEDURE — 80053 COMPREHEN METABOLIC PANEL: CPT

## 2021-02-15 PROCEDURE — 96374 THER/PROPH/DIAG INJ IV PUSH: CPT

## 2021-02-15 PROCEDURE — 86592 SYPHILIS TEST NON-TREP QUAL: CPT

## 2021-02-15 PROCEDURE — 86900 BLOOD TYPING SEROLOGIC ABO: CPT

## 2021-02-15 PROCEDURE — 81001 URINALYSIS AUTO W/SCOPE: CPT

## 2021-02-15 RX ORDER — NIFEDIPINE 10 MG/1
10 CAPSULE ORAL EVERY 8 HOURS SCHEDULED
Status: DISCONTINUED | OUTPATIENT
Start: 2021-02-15 | End: 2021-02-15

## 2021-02-15 RX ORDER — SODIUM CHLORIDE 0.9 % (FLUSH) 0.9 %
10 SYRINGE (ML) INJECTION PRN
Status: DISCONTINUED | OUTPATIENT
Start: 2021-02-15 | End: 2021-02-15 | Stop reason: HOSPADM

## 2021-02-15 RX ORDER — LABETALOL 200 MG/1
200 TABLET, FILM COATED ORAL 2 TIMES DAILY
Qty: 60 TABLET | Refills: 1 | Status: SHIPPED | OUTPATIENT
Start: 2021-02-15

## 2021-02-15 RX ORDER — LABETALOL HYDROCHLORIDE 5 MG/ML
20 INJECTION, SOLUTION INTRAVENOUS
Status: DISCONTINUED | OUTPATIENT
Start: 2021-02-15 | End: 2021-02-15 | Stop reason: HOSPADM

## 2021-02-15 RX ORDER — SODIUM CHLORIDE 0.9 % (FLUSH) 0.9 %
10 SYRINGE (ML) INJECTION EVERY 12 HOURS SCHEDULED
Status: DISCONTINUED | OUTPATIENT
Start: 2021-02-15 | End: 2021-02-15 | Stop reason: HOSPADM

## 2021-02-15 RX ORDER — CEFAZOLIN SODIUM 2 G/50ML
2000 SOLUTION INTRAVENOUS EVERY 8 HOURS
Status: DISCONTINUED | OUTPATIENT
Start: 2021-02-15 | End: 2021-02-15 | Stop reason: HOSPADM

## 2021-02-15 RX ORDER — CEFAZOLIN SODIUM 2 G/50ML
2000 SOLUTION INTRAVENOUS EVERY 8 HOURS
Status: DISCONTINUED | OUTPATIENT
Start: 2021-02-15 | End: 2021-02-15

## 2021-02-15 RX ORDER — NIFEDIPINE 10 MG/1
10 CAPSULE ORAL ONCE
Status: COMPLETED | OUTPATIENT
Start: 2021-02-15 | End: 2021-02-15

## 2021-02-15 RX ORDER — LABETALOL HYDROCHLORIDE 5 MG/ML
40 INJECTION, SOLUTION INTRAVENOUS
Status: DISCONTINUED | OUTPATIENT
Start: 2021-02-15 | End: 2021-02-15 | Stop reason: HOSPADM

## 2021-02-15 RX ORDER — SODIUM CHLORIDE, SODIUM LACTATE, POTASSIUM CHLORIDE, CALCIUM CHLORIDE 600; 310; 30; 20 MG/100ML; MG/100ML; MG/100ML; MG/100ML
INJECTION, SOLUTION INTRAVENOUS CONTINUOUS
Status: DISCONTINUED | OUTPATIENT
Start: 2021-02-15 | End: 2021-02-15 | Stop reason: HOSPADM

## 2021-02-15 RX ORDER — LABETALOL HYDROCHLORIDE 5 MG/ML
80 INJECTION, SOLUTION INTRAVENOUS
Status: DISCONTINUED | OUTPATIENT
Start: 2021-02-15 | End: 2021-02-15 | Stop reason: HOSPADM

## 2021-02-15 RX ORDER — HYDRALAZINE HYDROCHLORIDE 20 MG/ML
10 INJECTION INTRAMUSCULAR; INTRAVENOUS
Status: DISCONTINUED | OUTPATIENT
Start: 2021-02-15 | End: 2021-02-15 | Stop reason: HOSPADM

## 2021-02-15 RX ORDER — SODIUM CHLORIDE, SODIUM LACTATE, POTASSIUM CHLORIDE, CALCIUM CHLORIDE 600; 310; 30; 20 MG/100ML; MG/100ML; MG/100ML; MG/100ML
INJECTION, SOLUTION INTRAVENOUS CONTINUOUS
Status: DISCONTINUED | OUTPATIENT
Start: 2021-02-15 | End: 2021-02-15 | Stop reason: SDUPTHER

## 2021-02-15 RX ORDER — LABETALOL 200 MG/1
200 TABLET, FILM COATED ORAL ONCE
Status: COMPLETED | OUTPATIENT
Start: 2021-02-15 | End: 2021-02-15

## 2021-02-15 RX ADMIN — SODIUM CHLORIDE, POTASSIUM CHLORIDE, SODIUM LACTATE AND CALCIUM CHLORIDE: 600; 310; 30; 20 INJECTION, SOLUTION INTRAVENOUS at 18:19

## 2021-02-15 RX ADMIN — NIFEDIPINE 10 MG: 10 CAPSULE ORAL at 19:48

## 2021-02-15 RX ADMIN — CEFAZOLIN SODIUM 2000 MG: 2 SOLUTION INTRAVENOUS at 19:51

## 2021-02-15 RX ADMIN — LABETALOL HYDROCHLORIDE 200 MG: 200 TABLET, FILM COATED ORAL at 18:58

## 2021-02-15 NOTE — FLOWSHEET NOTE
Dr. Jamaal Hoover called and updated on patient and last 3 blood pressures. Telephone order of 200mg P.O now, once. Will be in at 1930 to see patient.

## 2021-02-15 NOTE — FLOWSHEET NOTE
Pt transported to ultrasound for for BPP. Dr. Nanci Gr made aware of bp and that tracing twins were difficult. Will update him after pt.  Returns from ultrasound

## 2021-02-15 NOTE — FLOWSHEET NOTE
Spoke to Dr. Jaime Gaona updated on the patient's blood pressure and BPP results. Dr. Jaime Gaona would like the patient to be brought out as an observation for blood pressures and for patient to have an IV started now and made NPO.

## 2021-02-16 LAB — RPR: NORMAL

## 2021-02-16 RX ORDER — NIFEDIPINE 30 MG/1
30 TABLET, EXTENDED RELEASE ORAL DAILY
Qty: 30 TABLET | Refills: 5 | Status: SHIPPED | OUTPATIENT
Start: 2021-02-16

## 2021-02-16 NOTE — FLOWSHEET NOTE
Dr South Males in room to see patient and discuss POC, viewed strip and updated on BP of 168/91 and 136/95. New orders for procardia 10mg PO and 2g ancef IV.

## 2021-02-16 NOTE — DISCHARGE SUMMARY
DISCHARGE SUMMARY  27year old .  Twin IUP 33 weeks  Gestational hypertension  Came in today for bp check  Rare UC  +fm times two  Cat 1 FHR times two  bp normalized with procardia 10mg tid and normodyne 200mg tid  Will discharge  Pt to followup in 48 hours for reevaluation

## 2021-02-16 NOTE — FLOWSHEET NOTE
Dr Gianluca Gomes updated on recent /73 and 123/60. States will be in to see patient with plans to discharge home at this time.

## 2021-02-16 NOTE — FLOWSHEET NOTE
Dr Renee Score updated on recent BP of 144/82. New orders to monitor next few BP every 15 minutes and update Dr Renee Score.

## 2021-02-16 NOTE — H&P
Department of Obstetrics and Gynecology   History & Physical    Pt Name: Miguel Del Rosario  MRN: [id number] Rob #: [de-identified]  YOB: 1990  Estimated Date of Delivery: 21      HPI: The patient is a 27 y.o.  female  who presents for evaluation of gestational hypertension. Twin gestation at 29 weeks. bpp today  for both. A breech B vertex. Allergies: Allergies as of 02/15/2021    (No Known Allergies)       Medications:    Current Facility-Administered Medications:     lactated ringers infusion, , Intravenous, Continuous, [place] Blade, DO, Last Rate: 125 mL/hr at 02/15/21 1819, New Bag at 02/15/21 1819    sodium chloride flush 0.9 % injection 10 mL, 10 mL, Intravenous, 2 times per day, [place] Blade, DO    sodium chloride flush 0.9 % injection 10 mL, 10 mL, Intravenous, PRN, [place] Blade, DO    labetalol (NORMODYNE;TRANDATE) injection 20 mg, 20 mg, Intravenous, Once PRN, [place] Blade, DO    labetalol (NORMODYNE;TRANDATE) injection 40 mg, 40 mg, Intravenous, Once PRN, [place] Blade, DO    labetalol (NORMODYNE;TRANDATE) injection 80 mg, 80 mg, Intravenous, Once PRN, [place] Blade, DO    hydrALAZINE (APRESOLINE) injection 10 mg, 10 mg, Intravenous, Once PRN, [place] Blade, DO    ceFAZolin (ANCEF) 2000 mg in dextrose 3 % 50 mL IVPB (duplex), 2,000 mg, Intravenous, Q8H, Arpit Kothari [facility] History: [id number]    Gyn History: Denies h/o abnormal pap smear, h/o STDs. Past Medical History: No past medical history on file. 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 ROS:  Negative except as stated in HPI, denies nausea, vomiting, fever, chills, headache or dysuria. PE:  Vitals:    02/15/21 1923   BP:    Pulse:    Resp:    Temp: 97.8 °F (36.6 °C)       General: well nourished, well developed, in no acute distress  CV: Normal heart sounds  Resp: breathing unlabored  Abdomen: Nontender, no rebound, no guarding  FH:  Cx:    NST - Cat 1: FHR 140s, moderate variability, +accels, -decels    Labs:       Assessment:   27 y.o. [place] female with gestational hypertension. Twin IUP breech/vertex.     Plan: normodyne 300mg tid fu 48 hours      Anna Estrella DO

## 2021-02-17 ENCOUNTER — HOSPITAL ENCOUNTER (OUTPATIENT)
Age: 31
Discharge: HOME | DRG: 540 | End: 2021-02-17
Attending: OBSTETRICS & GYNECOLOGY | Admitting: OBSTETRICS & GYNECOLOGY
Payer: COMMERCIAL

## 2021-02-17 VITALS — HEART RATE: 64 BPM | RESPIRATION RATE: 18 BRPM | SYSTOLIC BLOOD PRESSURE: 154 MMHG | DIASTOLIC BLOOD PRESSURE: 92 MMHG

## 2021-02-17 LAB
ALBUMIN SERPL-MCNC: 2.9 G/DL (ref 3.5–4.6)
ALP BLD-CCNC: 199 U/L (ref 40–130)
ALT SERPL-CCNC: 7 U/L (ref 0–33)
AMPHETAMINE SCREEN, URINE: ABNORMAL
ANION GAP SERPL CALCULATED.3IONS-SCNC: 11 MEQ/L (ref 9–15)
AST SERPL-CCNC: 19 U/L (ref 0–35)
BACTERIA: NEGATIVE /HPF
BARBITURATE SCREEN URINE: ABNORMAL
BASOPHILS ABSOLUTE: 0 K/UL (ref 0–0.2)
BASOPHILS RELATIVE PERCENT: 0.3 %
BENZODIAZEPINE SCREEN, URINE: ABNORMAL
BILIRUB SERPL-MCNC: 0.7 MG/DL (ref 0.2–0.7)
BILIRUBIN URINE: NEGATIVE
BLOOD, URINE: NEGATIVE
BUN BLDV-MCNC: 7 MG/DL (ref 6–20)
CALCIUM SERPL-MCNC: 8.6 MG/DL (ref 8.5–9.9)
CANNABINOID SCREEN URINE: POSITIVE
CHLORIDE BLD-SCNC: 105 MEQ/L (ref 95–107)
CLARITY: CLEAR
CO2: 19 MEQ/L (ref 20–31)
COCAINE METABOLITE SCREEN URINE: ABNORMAL
COLOR: YELLOW
CREAT SERPL-MCNC: 0.57 MG/DL (ref 0.5–0.9)
CREATININE URINE: 88.7 MG/DL
EOSINOPHILS ABSOLUTE: 0.1 K/UL (ref 0–0.7)
EOSINOPHILS RELATIVE PERCENT: 1 %
EPITHELIAL CELLS, UA: ABNORMAL /HPF (ref 0–5)
GFR AFRICAN AMERICAN: >60
GFR NON-AFRICAN AMERICAN: >60
GLOBULIN: 2.9 G/DL (ref 2.3–3.5)
GLUCOSE BLD-MCNC: 91 MG/DL (ref 70–99)
GLUCOSE URINE: NEGATIVE MG/DL
HCT VFR BLD CALC: 27.1 % (ref 37–47)
HEMOGLOBIN: 9.1 G/DL (ref 12–16)
HYALINE CASTS: ABNORMAL /HPF (ref 0–5)
KETONES, URINE: NEGATIVE MG/DL
LEUKOCYTE ESTERASE, URINE: ABNORMAL
LYMPHOCYTES ABSOLUTE: 1.8 K/UL (ref 1–4.8)
LYMPHOCYTES RELATIVE PERCENT: 31.8 %
Lab: ABNORMAL
MCH RBC QN AUTO: 33.3 PG (ref 27–31.3)
MCHC RBC AUTO-ENTMCNC: 33.5 % (ref 33–37)
MCV RBC AUTO: 99.5 FL (ref 82–100)
METHADONE SCREEN, URINE: ABNORMAL
MONOCYTES ABSOLUTE: 0.7 K/UL (ref 0.2–0.8)
MONOCYTES RELATIVE PERCENT: 12.1 %
NEUTROPHILS ABSOLUTE: 3.1 K/UL (ref 1.4–6.5)
NEUTROPHILS RELATIVE PERCENT: 54.8 %
NITRITE, URINE: NEGATIVE
OPIATE SCREEN URINE: ABNORMAL
OXYCODONE URINE: ABNORMAL
PDW BLD-RTO: 14.4 % (ref 11.5–14.5)
PH UA: 7 (ref 5–9)
PHENCYCLIDINE SCREEN URINE: ABNORMAL
PLATELET # BLD: 188 K/UL (ref 130–400)
POTASSIUM SERPL-SCNC: 3.8 MEQ/L (ref 3.4–4.9)
PROPOXYPHENE SCREEN: ABNORMAL
PROTEIN PROTEIN: 30 MG/DL
PROTEIN UA: ABNORMAL MG/DL
PROTEIN/CREAT RATIO: 0.3 ML/ML
PROTEIN/CREAT RATIO: 0.3 ML/ML (ref 0–0.2)
RBC # BLD: 2.72 M/UL (ref 4.2–5.4)
RBC UA: ABNORMAL /HPF (ref 0–2)
SODIUM BLD-SCNC: 135 MEQ/L (ref 135–144)
SPECIFIC GRAVITY UA: 1.01 (ref 1–1.03)
TOTAL PROTEIN: 5.8 G/DL (ref 6.3–8)
URIC ACID, SERUM: 5.7 MG/DL (ref 2.4–5.7)
UROBILINOGEN, URINE: 0.2 E.U./DL
WBC # BLD: 5.7 K/UL (ref 4.8–10.8)
WBC UA: ABNORMAL /HPF (ref 0–5)
YEAST: PRESENT /HPF

## 2021-02-17 PROCEDURE — 59025 FETAL NON-STRESS TEST: CPT

## 2021-02-17 PROCEDURE — 80053 COMPREHEN METABOLIC PANEL: CPT

## 2021-02-17 PROCEDURE — 36415 COLL VENOUS BLD VENIPUNCTURE: CPT

## 2021-02-17 PROCEDURE — 59025 FETAL NON-STRESS TEST: CPT | Performed by: OBSTETRICS & GYNECOLOGY

## 2021-02-17 PROCEDURE — 80307 DRUG TEST PRSMV CHEM ANLYZR: CPT

## 2021-02-17 PROCEDURE — 6370000000 HC RX 637 (ALT 250 FOR IP): Performed by: OBSTETRICS & GYNECOLOGY

## 2021-02-17 PROCEDURE — 85025 COMPLETE CBC W/AUTO DIFF WBC: CPT

## 2021-02-17 PROCEDURE — 81001 URINALYSIS AUTO W/SCOPE: CPT

## 2021-02-17 PROCEDURE — 84156 ASSAY OF PROTEIN URINE: CPT

## 2021-02-17 PROCEDURE — 84550 ASSAY OF BLOOD/URIC ACID: CPT

## 2021-02-17 RX ORDER — LABETALOL 200 MG/1
200 TABLET, FILM COATED ORAL ONCE
Status: COMPLETED | OUTPATIENT
Start: 2021-02-17 | End: 2021-02-17

## 2021-02-17 RX ADMIN — LABETALOL HYDROCHLORIDE 200 MG: 200 TABLET, FILM COATED ORAL at 15:14

## 2021-02-17 NOTE — FLOWSHEET NOTE
PT DENIES HEADACHE, BLURRINESS OF VISION OR EPIGASTRIC DISCOMFORT. PT ANXIOUS TO GET HOME TO HER CHILDREN.  STATES \"ITS ALREADY 4:30\" INFORMED PT THIS NURSE WOULD CALL THE DOCTOR NOW AND INFORM HIM OF THE B/P

## 2021-02-17 NOTE — FLOWSHEET NOTE
DR ELIZONDO NOTIFIED OF B/P ONE HOUR AFTER RECEIVING NORMADYNE 200MG. 154/92. PT TO GO HOME AND RETURN  ON Friday 2/19 AT 1300 FOR NST AND PI[facility] LABS WITH B/P SHE. PT TO TAKE NORMADYNE 3X A DAY.

## 2021-02-17 NOTE — FLOWSHEET NOTE
Pt arrived to triage. Pt placed on monitors. Baby A is tracing blue, lower left abdomen. Baby B is tracing yellow, upper right abdomen.

## 2021-02-17 NOTE — PROGRESS NOTES
27 y.o. [id number] @ 33w3d   Here for  testing    The indication for antepartum testing is Hypertensive Disease in Pregnancy and twin gestation    Fetal Heart Rate Baseline:  150/140's    Accelerations greater than or equal to 2 accelerations in 20 minutes? yes  Accelerations greater than or equal to 15 BPM above baseline, for at least 15 seconds for gestations >32 weeks? oAccelerations greater than or equal to 10 BPM above baseline, for at least 10 seconds for gestations <32 weeks?   (33w3d )  yes    Decelerations: none  Contractions: no    Acoustic Stimulator: no  Uterine Irritability: no    TEST RESULTS  NST reactive    Comments:     Recommendations: Repeat in 3 days      Alvaro Mackay MBA, [facility]  2021

## 2021-02-17 NOTE — FLOWSHEET NOTE
Dr Beckie Mora called given b/p x 3. States he did not receive the NST but can have house physician read. Check B/P one hour after giving a dose of 200 mg Labetalol.

## 2021-02-17 NOTE — FLOWSHEET NOTE
Faxed NST to Dr Mamadou Aguirre at [facility]. From [phone].  Called to let them know NST sent

## 2021-02-19 ENCOUNTER — ANESTHESIA EVENT (OUTPATIENT)
Dept: LABOR AND DELIVERY | Age: 31
DRG: 540 | End: 2021-02-19
Payer: COMMERCIAL

## 2021-02-19 ENCOUNTER — ANESTHESIA (OUTPATIENT)
Dept: LABOR AND DELIVERY | Age: 31
DRG: 540 | End: 2021-02-19
Payer: COMMERCIAL

## 2021-02-19 ENCOUNTER — HOSPITAL ENCOUNTER (INPATIENT)
Age: 31
LOS: 3 days | Discharge: HOME | DRG: 540 | End: 2021-02-22
Attending: OBSTETRICS & GYNECOLOGY | Admitting: OBSTETRICS & GYNECOLOGY
Payer: COMMERCIAL

## 2021-02-19 VITALS — DIASTOLIC BLOOD PRESSURE: 53 MMHG | OXYGEN SATURATION: 92 % | SYSTOLIC BLOOD PRESSURE: 104 MMHG

## 2021-02-19 DIAGNOSIS — G89.18 POSTOPERATIVE PAIN: Primary | ICD-10-CM

## 2021-02-19 PROBLEM — O42.919 PRETERM PREMATURE RUPTURE OF MEMBRANES (PPROM) WITH UNKNOWN ONSET OF LABOR: Status: ACTIVE | Noted: 2021-02-19

## 2021-02-19 LAB
ABO/RH: NORMAL
ALBUMIN SERPL-MCNC: 3.2 G/DL (ref 3.5–4.6)
ALP BLD-CCNC: 213 U/L (ref 40–130)
ALT SERPL-CCNC: 7 U/L (ref 0–33)
AMPHETAMINE SCREEN, URINE: ABNORMAL
ANION GAP SERPL CALCULATED.3IONS-SCNC: 10 MEQ/L (ref 9–15)
ANTIBODY SCREEN: NORMAL
AST SERPL-CCNC: 28 U/L (ref 0–35)
BACTERIA: ABNORMAL /HPF
BARBITURATE SCREEN URINE: ABNORMAL
BASOPHILS ABSOLUTE: 0 K/UL (ref 0–0.2)
BASOPHILS RELATIVE PERCENT: 0.4 %
BENZODIAZEPINE SCREEN, URINE: ABNORMAL
BILIRUB SERPL-MCNC: 0.9 MG/DL (ref 0.2–0.7)
BILIRUBIN URINE: NEGATIVE
BLOOD, URINE: ABNORMAL
BUN BLDV-MCNC: 7 MG/DL (ref 6–20)
CALCIUM SERPL-MCNC: 8.7 MG/DL (ref 8.5–9.9)
CANNABINOID SCREEN URINE: POSITIVE
CHLORIDE BLD-SCNC: 106 MEQ/L (ref 95–107)
CLARITY: ABNORMAL
CO2: 22 MEQ/L (ref 20–31)
COCAINE METABOLITE SCREEN URINE: ABNORMAL
COLOR: YELLOW
CREAT SERPL-MCNC: 0.56 MG/DL (ref 0.5–0.9)
EOSINOPHILS ABSOLUTE: 0 K/UL (ref 0–0.7)
EOSINOPHILS RELATIVE PERCENT: 0.6 %
EPITHELIAL CELLS, UA: ABNORMAL /HPF (ref 0–5)
GFR AFRICAN AMERICAN: >60
GFR NON-AFRICAN AMERICAN: >60
GLOBULIN: 3.2 G/DL (ref 2.3–3.5)
GLUCOSE BLD-MCNC: 85 MG/DL (ref 70–99)
GLUCOSE URINE: NEGATIVE MG/DL
HCT VFR BLD CALC: 30 % (ref 37–47)
HEMOGLOBIN: 10 G/DL (ref 12–16)
HEPATITIS B SURFACE ANTIGEN INTERPRETATION: NORMAL
HYALINE CASTS: ABNORMAL /HPF (ref 0–5)
KETONES, URINE: NEGATIVE MG/DL
LEUKOCYTE ESTERASE, URINE: ABNORMAL
LYMPHOCYTES ABSOLUTE: 1.7 K/UL (ref 1–4.8)
LYMPHOCYTES RELATIVE PERCENT: 25.3 %
Lab: ABNORMAL
MCH RBC QN AUTO: 33.3 PG (ref 27–31.3)
MCHC RBC AUTO-ENTMCNC: 33.4 % (ref 33–37)
MCV RBC AUTO: 99.7 FL (ref 82–100)
METHADONE SCREEN, URINE: ABNORMAL
MONOCYTES ABSOLUTE: 0.6 K/UL (ref 0.2–0.8)
MONOCYTES RELATIVE PERCENT: 9.1 %
NEUTROPHILS ABSOLUTE: 4.4 K/UL (ref 1.4–6.5)
NEUTROPHILS RELATIVE PERCENT: 64.6 %
NITRITE, URINE: NEGATIVE
OPIATE SCREEN URINE: ABNORMAL
PDW BLD-RTO: 14.6 % (ref 11.5–14.5)
PH UA: 6.5 (ref 5–9)
PHENCYCLIDINE SCREEN URINE: ABNORMAL
PLATELET # BLD: 192 K/UL (ref 130–400)
POTASSIUM SERPL-SCNC: 3.9 MEQ/L (ref 3.4–4.9)
PROPOXYPHENE SCREEN, URINE: ABNORMAL
PROTEIN UA: 100 MG/DL
RBC # BLD: 3.01 M/UL (ref 4.2–5.4)
RBC UA: ABNORMAL /HPF (ref 0–5)
SARS-COV-2, NAAT: NOT DETECTED
SODIUM BLD-SCNC: 138 MEQ/L (ref 135–144)
SPECIFIC GRAVITY UA: 1.01 (ref 1–1.03)
TOTAL PROTEIN: 6.4 G/DL (ref 6.3–8)
UR OXYCODONE RAPID SCREEN: ABNORMAL
UROBILINOGEN, URINE: 1 E.U./DL
WBC # BLD: 6.8 K/UL (ref 4.8–10.8)
WBC UA: ABNORMAL /HPF (ref 0–5)

## 2021-02-19 PROCEDURE — 80053 COMPREHEN METABOLIC PANEL: CPT

## 2021-02-19 PROCEDURE — 3700000001 HC ADD 15 MINUTES (ANESTHESIA): Performed by: OBSTETRICS & GYNECOLOGY

## 2021-02-19 PROCEDURE — 87081 CULTURE SCREEN ONLY: CPT

## 2021-02-19 PROCEDURE — 6360000002 HC RX W HCPCS

## 2021-02-19 PROCEDURE — 87340 HEPATITIS B SURFACE AG IA: CPT

## 2021-02-19 PROCEDURE — 36415 COLL VENOUS BLD VENIPUNCTURE: CPT

## 2021-02-19 PROCEDURE — 6370000000 HC RX 637 (ALT 250 FOR IP): Performed by: OBSTETRICS & GYNECOLOGY

## 2021-02-19 PROCEDURE — 87635 SARS-COV-2 COVID-19 AMP PRB: CPT

## 2021-02-19 PROCEDURE — 86900 BLOOD TYPING SEROLOGIC ABO: CPT

## 2021-02-19 PROCEDURE — 6360000002 HC RX W HCPCS: Performed by: NURSE ANESTHETIST, CERTIFIED REGISTERED

## 2021-02-19 PROCEDURE — 59514 CESAREAN DELIVERY ONLY: CPT | Performed by: OBSTETRICS & GYNECOLOGY

## 2021-02-19 PROCEDURE — 2500000003 HC RX 250 WO HCPCS: Performed by: OBSTETRICS & GYNECOLOGY

## 2021-02-19 PROCEDURE — 2709999900 HC NON-CHARGEABLE SUPPLY: Performed by: OBSTETRICS & GYNECOLOGY

## 2021-02-19 PROCEDURE — 85025 COMPLETE CBC W/AUTO DIFF WBC: CPT

## 2021-02-19 PROCEDURE — 87077 CULTURE AEROBIC IDENTIFY: CPT

## 2021-02-19 PROCEDURE — 86850 RBC ANTIBODY SCREEN: CPT

## 2021-02-19 PROCEDURE — 86901 BLOOD TYPING SEROLOGIC RH(D): CPT

## 2021-02-19 PROCEDURE — 3609079900 HC CESAREAN SECTION: Performed by: OBSTETRICS & GYNECOLOGY

## 2021-02-19 PROCEDURE — 2580000003 HC RX 258: Performed by: NURSE ANESTHETIST, CERTIFIED REGISTERED

## 2021-02-19 PROCEDURE — 1220000000 HC SEMI PRIVATE OB R&B

## 2021-02-19 PROCEDURE — 6360000002 HC RX W HCPCS: Performed by: OBSTETRICS & GYNECOLOGY

## 2021-02-19 PROCEDURE — 2580000003 HC RX 258: Performed by: OBSTETRICS & GYNECOLOGY

## 2021-02-19 PROCEDURE — 86592 SYPHILIS TEST NON-TREP QUAL: CPT

## 2021-02-19 PROCEDURE — 2500000003 HC RX 250 WO HCPCS: Performed by: NURSE ANESTHETIST, CERTIFIED REGISTERED

## 2021-02-19 PROCEDURE — 7100000000 HC PACU RECOVERY - FIRST 15 MIN: Performed by: OBSTETRICS & GYNECOLOGY

## 2021-02-19 PROCEDURE — 3700000000 HC ANESTHESIA ATTENDED CARE: Performed by: OBSTETRICS & GYNECOLOGY

## 2021-02-19 PROCEDURE — 88307 TISSUE EXAM BY PATHOLOGIST: CPT

## 2021-02-19 PROCEDURE — 2500000003 HC RX 250 WO HCPCS

## 2021-02-19 PROCEDURE — 81001 URINALYSIS AUTO W/SCOPE: CPT

## 2021-02-19 PROCEDURE — 7100000001 HC PACU RECOVERY - ADDTL 15 MIN: Performed by: OBSTETRICS & GYNECOLOGY

## 2021-02-19 PROCEDURE — 80306 DRUG TEST PRSMV INSTRMNT: CPT

## 2021-02-19 RX ORDER — LABETALOL HYDROCHLORIDE 5 MG/ML
20 INJECTION, SOLUTION INTRAVENOUS
Status: COMPLETED | OUTPATIENT
Start: 2021-02-19 | End: 2021-02-19

## 2021-02-19 RX ORDER — DEXAMETHASONE SODIUM PHOSPHATE 10 MG/ML
INJECTION, SOLUTION INTRAMUSCULAR; INTRAVENOUS PRN
Status: DISCONTINUED | OUTPATIENT
Start: 2021-02-19 | End: 2021-02-19 | Stop reason: SDUPTHER

## 2021-02-19 RX ORDER — SODIUM CHLORIDE, SODIUM LACTATE, POTASSIUM CHLORIDE, CALCIUM CHLORIDE 600; 310; 30; 20 MG/100ML; MG/100ML; MG/100ML; MG/100ML
INJECTION, SOLUTION INTRAVENOUS CONTINUOUS
Status: DISCONTINUED | OUTPATIENT
Start: 2021-02-19 | End: 2021-02-19

## 2021-02-19 RX ORDER — SODIUM CHLORIDE, SODIUM LACTATE, POTASSIUM CHLORIDE, AND CALCIUM CHLORIDE .6; .31; .03; .02 G/100ML; G/100ML; G/100ML; G/100ML
1000 INJECTION, SOLUTION INTRAVENOUS ONCE
Status: DISCONTINUED | OUTPATIENT
Start: 2021-02-19 | End: 2021-02-19

## 2021-02-19 RX ORDER — OXYCODONE HYDROCHLORIDE AND ACETAMINOPHEN 5; 325 MG/1; MG/1
1 TABLET ORAL EVERY 4 HOURS PRN
Status: DISCONTINUED | OUTPATIENT
Start: 2021-02-19 | End: 2021-02-22 | Stop reason: HOSPADM

## 2021-02-19 RX ORDER — FENTANYL CITRATE 50 UG/ML
INJECTION, SOLUTION INTRAMUSCULAR; INTRAVENOUS PRN
Status: DISCONTINUED | OUTPATIENT
Start: 2021-02-19 | End: 2021-02-19 | Stop reason: SDUPTHER

## 2021-02-19 RX ORDER — OXYCODONE HYDROCHLORIDE AND ACETAMINOPHEN 5; 325 MG/1; MG/1
2 TABLET ORAL EVERY 4 HOURS PRN
Status: DISCONTINUED | OUTPATIENT
Start: 2021-02-19 | End: 2021-02-22 | Stop reason: HOSPADM

## 2021-02-19 RX ORDER — ONDANSETRON 2 MG/ML
INJECTION INTRAMUSCULAR; INTRAVENOUS PRN
Status: DISCONTINUED | OUTPATIENT
Start: 2021-02-19 | End: 2021-02-19 | Stop reason: SDUPTHER

## 2021-02-19 RX ORDER — LABETALOL HYDROCHLORIDE 5 MG/ML
20 INJECTION, SOLUTION INTRAVENOUS ONCE
Status: COMPLETED | OUTPATIENT
Start: 2021-02-19 | End: 2021-02-19

## 2021-02-19 RX ORDER — FENTANYL CITRATE 50 UG/ML
INJECTION, SOLUTION INTRAMUSCULAR; INTRAVENOUS PRN
Status: DISCONTINUED | OUTPATIENT
Start: 2021-02-19 | End: 2021-02-19

## 2021-02-19 RX ORDER — SODIUM CHLORIDE 0.9 % (FLUSH) 0.9 %
10 SYRINGE (ML) INJECTION PRN
Status: DISCONTINUED | OUTPATIENT
Start: 2021-02-19 | End: 2021-02-19

## 2021-02-19 RX ORDER — SODIUM CHLORIDE 0.9 % (FLUSH) 0.9 %
10 SYRINGE (ML) INJECTION PRN
Status: DISCONTINUED | OUTPATIENT
Start: 2021-02-19 | End: 2021-02-22 | Stop reason: HOSPADM

## 2021-02-19 RX ORDER — SODIUM CHLORIDE, SODIUM LACTATE, POTASSIUM CHLORIDE, CALCIUM CHLORIDE 600; 310; 30; 20 MG/100ML; MG/100ML; MG/100ML; MG/100ML
INJECTION, SOLUTION INTRAVENOUS CONTINUOUS
Status: DISCONTINUED | OUTPATIENT
Start: 2021-02-19 | End: 2021-02-20

## 2021-02-19 RX ORDER — MAGNESIUM SULFATE IN WATER 40 MG/ML
4000 INJECTION, SOLUTION INTRAVENOUS ONCE
Status: COMPLETED | OUTPATIENT
Start: 2021-02-19 | End: 2021-02-19

## 2021-02-19 RX ORDER — LABETALOL HYDROCHLORIDE 5 MG/ML
INJECTION, SOLUTION INTRAVENOUS
Status: COMPLETED
Start: 2021-02-19 | End: 2021-02-19

## 2021-02-19 RX ORDER — LABETALOL HYDROCHLORIDE 5 MG/ML
80 INJECTION, SOLUTION INTRAVENOUS
Status: DISPENSED | OUTPATIENT
Start: 2021-02-19 | End: 2021-02-19

## 2021-02-19 RX ORDER — ONDANSETRON 2 MG/ML
4 INJECTION INTRAMUSCULAR; INTRAVENOUS EVERY 6 HOURS PRN
Status: DISCONTINUED | OUTPATIENT
Start: 2021-02-19 | End: 2021-02-19

## 2021-02-19 RX ORDER — MAGNESIUM SULFATE IN WATER 40 MG/ML
INJECTION, SOLUTION INTRAVENOUS
Status: COMPLETED
Start: 2021-02-19 | End: 2021-02-19

## 2021-02-19 RX ORDER — HYDRALAZINE HYDROCHLORIDE 20 MG/ML
10 INJECTION INTRAMUSCULAR; INTRAVENOUS
Status: ACTIVE | OUTPATIENT
Start: 2021-02-19 | End: 2021-02-19

## 2021-02-19 RX ORDER — MIDAZOLAM HYDROCHLORIDE 2 MG/2ML
INJECTION, SOLUTION INTRAMUSCULAR; INTRAVENOUS PRN
Status: DISCONTINUED | OUTPATIENT
Start: 2021-02-19 | End: 2021-02-19 | Stop reason: SDUPTHER

## 2021-02-19 RX ORDER — MODIFIED LANOLIN
OINTMENT (GRAM) TOPICAL
Status: DISCONTINUED | OUTPATIENT
Start: 2021-02-19 | End: 2021-02-22 | Stop reason: HOSPADM

## 2021-02-19 RX ORDER — SODIUM CHLORIDE 0.9 % (FLUSH) 0.9 %
10 SYRINGE (ML) INJECTION EVERY 12 HOURS SCHEDULED
Status: DISCONTINUED | OUTPATIENT
Start: 2021-02-19 | End: 2021-02-19

## 2021-02-19 RX ORDER — METOCLOPRAMIDE HYDROCHLORIDE 5 MG/ML
10 INJECTION INTRAMUSCULAR; INTRAVENOUS EVERY 6 HOURS
Status: DISCONTINUED | OUTPATIENT
Start: 2021-02-19 | End: 2021-02-19

## 2021-02-19 RX ORDER — MORPHINE SULFATE 1 MG/ML
INJECTION, SOLUTION EPIDURAL; INTRATHECAL; INTRAVENOUS PRN
Status: DISCONTINUED | OUTPATIENT
Start: 2021-02-19 | End: 2021-02-19 | Stop reason: SDUPTHER

## 2021-02-19 RX ORDER — NALBUPHINE HCL 10 MG/ML
5 AMPUL (ML) INJECTION EVERY 4 HOURS PRN
Status: DISCONTINUED | OUTPATIENT
Start: 2021-02-19 | End: 2021-02-19

## 2021-02-19 RX ORDER — LABETALOL 200 MG/1
200 TABLET, FILM COATED ORAL 3 TIMES DAILY
Status: DISCONTINUED | OUTPATIENT
Start: 2021-02-19 | End: 2021-02-22 | Stop reason: HOSPADM

## 2021-02-19 RX ORDER — IBUPROFEN 800 MG/1
800 TABLET ORAL EVERY 8 HOURS
Status: DISCONTINUED | OUTPATIENT
Start: 2021-02-21 | End: 2021-02-20

## 2021-02-19 RX ORDER — TRISODIUM CITRATE DIHYDRATE AND CITRIC ACID MONOHYDRATE 500; 334 MG/5ML; MG/5ML
30 SOLUTION ORAL ONCE
Status: COMPLETED | OUTPATIENT
Start: 2021-02-19 | End: 2021-02-19

## 2021-02-19 RX ORDER — LABETALOL HYDROCHLORIDE 5 MG/ML
40 INJECTION, SOLUTION INTRAVENOUS
Status: COMPLETED | OUTPATIENT
Start: 2021-02-19 | End: 2021-02-19

## 2021-02-19 RX ORDER — KETOROLAC TROMETHAMINE 30 MG/ML
30 INJECTION, SOLUTION INTRAMUSCULAR; INTRAVENOUS EVERY 6 HOURS
Status: DISCONTINUED | OUTPATIENT
Start: 2021-02-19 | End: 2021-02-20

## 2021-02-19 RX ORDER — CEFAZOLIN SODIUM 2 G/50ML
2 SOLUTION INTRAVENOUS ONCE
Status: COMPLETED | OUTPATIENT
Start: 2021-02-19 | End: 2021-02-19

## 2021-02-19 RX ORDER — CALCIUM GLUCONATE 94 MG/ML
1000 INJECTION, SOLUTION INTRAVENOUS PRN
Status: DISCONTINUED | OUTPATIENT
Start: 2021-02-19 | End: 2021-02-22 | Stop reason: HOSPADM

## 2021-02-19 RX ORDER — KETOROLAC TROMETHAMINE 30 MG/ML
INJECTION, SOLUTION INTRAMUSCULAR; INTRAVENOUS PRN
Status: DISCONTINUED | OUTPATIENT
Start: 2021-02-19 | End: 2021-02-19 | Stop reason: SDUPTHER

## 2021-02-19 RX ORDER — ONDANSETRON 2 MG/ML
4 INJECTION INTRAMUSCULAR; INTRAVENOUS EVERY 6 HOURS PRN
Status: DISCONTINUED | OUTPATIENT
Start: 2021-02-19 | End: 2021-02-22 | Stop reason: HOSPADM

## 2021-02-19 RX ORDER — BUPIVACAINE HYDROCHLORIDE 7.5 MG/ML
INJECTION, SOLUTION INTRASPINAL PRN
Status: DISCONTINUED | OUTPATIENT
Start: 2021-02-19 | End: 2021-02-19 | Stop reason: SDUPTHER

## 2021-02-19 RX ORDER — METOCLOPRAMIDE HYDROCHLORIDE 5 MG/ML
INJECTION INTRAMUSCULAR; INTRAVENOUS
Status: COMPLETED
Start: 2021-02-19 | End: 2021-02-19

## 2021-02-19 RX ORDER — BISACODYL 10 MG
10 SUPPOSITORY, RECTAL RECTAL DAILY PRN
Status: DISCONTINUED | OUTPATIENT
Start: 2021-02-19 | End: 2021-02-22 | Stop reason: HOSPADM

## 2021-02-19 RX ORDER — SODIUM CHLORIDE 0.9 % (FLUSH) 0.9 %
10 SYRINGE (ML) INJECTION EVERY 12 HOURS SCHEDULED
Status: DISCONTINUED | OUTPATIENT
Start: 2021-02-19 | End: 2021-02-22 | Stop reason: HOSPADM

## 2021-02-19 RX ORDER — DOCUSATE SODIUM 100 MG/1
100 CAPSULE, LIQUID FILLED ORAL 2 TIMES DAILY
Status: DISCONTINUED | OUTPATIENT
Start: 2021-02-19 | End: 2021-02-22 | Stop reason: HOSPADM

## 2021-02-19 RX ORDER — SODIUM CHLORIDE, SODIUM LACTATE, POTASSIUM CHLORIDE, CALCIUM CHLORIDE 600; 310; 30; 20 MG/100ML; MG/100ML; MG/100ML; MG/100ML
INJECTION, SOLUTION INTRAVENOUS
Status: COMPLETED
Start: 2021-02-19 | End: 2021-02-19

## 2021-02-19 RX ORDER — NALOXONE HYDROCHLORIDE 0.4 MG/ML
0.4 INJECTION, SOLUTION INTRAMUSCULAR; INTRAVENOUS; SUBCUTANEOUS PRN
Status: DISCONTINUED | OUTPATIENT
Start: 2021-02-19 | End: 2021-02-19

## 2021-02-19 RX ORDER — SCOLOPAMINE TRANSDERMAL SYSTEM 1 MG/1
1 PATCH, EXTENDED RELEASE TRANSDERMAL
Status: DISCONTINUED | OUTPATIENT
Start: 2021-02-19 | End: 2021-02-19

## 2021-02-19 RX ORDER — SODIUM CHLORIDE, SODIUM LACTATE, POTASSIUM CHLORIDE, CALCIUM CHLORIDE 600; 310; 30; 20 MG/100ML; MG/100ML; MG/100ML; MG/100ML
INJECTION, SOLUTION INTRAVENOUS CONTINUOUS PRN
Status: DISCONTINUED | OUTPATIENT
Start: 2021-02-19 | End: 2021-02-19 | Stop reason: SDUPTHER

## 2021-02-19 RX ADMIN — MAGNESIUM SULFATE HEPTAHYDRATE 4000 MG: 40 INJECTION, SOLUTION INTRAVENOUS at 18:55

## 2021-02-19 RX ADMIN — LABETALOL HYDROCHLORIDE 20 MG: 5 INJECTION, SOLUTION INTRAVENOUS at 18:54

## 2021-02-19 RX ADMIN — PHENYLEPHRINE HYDROCHLORIDE 100 MCG: 10 INJECTION INTRAVENOUS at 16:30

## 2021-02-19 RX ADMIN — PHENYLEPHRINE HYDROCHLORIDE 100 MCG: 10 INJECTION INTRAVENOUS at 16:17

## 2021-02-19 RX ADMIN — KETOROLAC TROMETHAMINE 30 MG: 30 INJECTION, SOLUTION INTRAMUSCULAR; INTRAVENOUS at 23:32

## 2021-02-19 RX ADMIN — FENTANYL CITRATE 15 MCG: 50 INJECTION, SOLUTION INTRAMUSCULAR; INTRAVENOUS at 15:58

## 2021-02-19 RX ADMIN — KETOROLAC TROMETHAMINE 30 MG: 30 INJECTION, SOLUTION INTRAMUSCULAR; INTRAVENOUS at 16:37

## 2021-02-19 RX ADMIN — PHENYLEPHRINE HYDROCHLORIDE 100 MCG: 10 INJECTION INTRAVENOUS at 16:01

## 2021-02-19 RX ADMIN — SODIUM CITRATE AND CITRIC ACID MONOHYDRATE 30 ML: 500; 334 SOLUTION ORAL at 15:20

## 2021-02-19 RX ADMIN — METOCLOPRAMIDE HYDROCHLORIDE 10 MG: 5 INJECTION INTRAMUSCULAR; INTRAVENOUS at 15:36

## 2021-02-19 RX ADMIN — LABETALOL HYDROCHLORIDE 200 MG: 200 TABLET, FILM COATED ORAL at 21:45

## 2021-02-19 RX ADMIN — MAGNESIUM SULFATE HEPTAHYDRATE 2 G/HR: 40 INJECTION, SOLUTION INTRAVENOUS at 19:28

## 2021-02-19 RX ADMIN — MIDAZOLAM HYDROCHLORIDE 2 MG: 1 INJECTION, SOLUTION INTRAMUSCULAR; INTRAVENOUS at 16:28

## 2021-02-19 RX ADMIN — PHENYLEPHRINE HYDROCHLORIDE 100 MCG: 10 INJECTION INTRAVENOUS at 16:07

## 2021-02-19 RX ADMIN — LABETALOL HYDROCHLORIDE 40 MG: 5 INJECTION, SOLUTION INTRAVENOUS at 22:30

## 2021-02-19 RX ADMIN — SODIUM CHLORIDE, POTASSIUM CHLORIDE, SODIUM LACTATE AND CALCIUM CHLORIDE: 600; 310; 30; 20 INJECTION, SOLUTION INTRAVENOUS at 15:52

## 2021-02-19 RX ADMIN — ONDANSETRON 4 MG: 2 INJECTION INTRAMUSCULAR; INTRAVENOUS at 16:18

## 2021-02-19 RX ADMIN — FAMOTIDINE 20 MG: 10 INJECTION, SOLUTION INTRAVENOUS at 15:35

## 2021-02-19 RX ADMIN — MAGNESIUM SULFATE IN WATER 4000 MG: 40 INJECTION, SOLUTION INTRAVENOUS at 18:55

## 2021-02-19 RX ADMIN — PHENYLEPHRINE HYDROCHLORIDE 100 MCG: 10 INJECTION INTRAVENOUS at 16:21

## 2021-02-19 RX ADMIN — LABETALOL HYDROCHLORIDE 20 MG: 5 INJECTION, SOLUTION INTRAVENOUS at 22:10

## 2021-02-19 RX ADMIN — BUPIVACAINE HYDROCHLORIDE 1.6 ML: 7.5 INJECTION, SOLUTION INTRASPINAL at 15:58

## 2021-02-19 RX ADMIN — MORPHINE SULFATE 0.2 MG: 1 INJECTION EPIDURAL; INTRATHECAL; INTRAVENOUS at 15:58

## 2021-02-19 RX ADMIN — SODIUM CHLORIDE, POTASSIUM CHLORIDE, SODIUM LACTATE AND CALCIUM CHLORIDE: 600; 310; 30; 20 INJECTION, SOLUTION INTRAVENOUS at 16:13

## 2021-02-19 RX ADMIN — PHENYLEPHRINE HYDROCHLORIDE 100 MCG: 10 INJECTION INTRAVENOUS at 16:11

## 2021-02-19 RX ADMIN — DEXAMETHASONE SODIUM PHOSPHATE 10 MG: 10 INJECTION, SOLUTION INTRAMUSCULAR; INTRAVENOUS at 16:18

## 2021-02-19 RX ADMIN — SODIUM CHLORIDE, POTASSIUM CHLORIDE, SODIUM LACTATE AND CALCIUM CHLORIDE: 600; 310; 30; 20 INJECTION, SOLUTION INTRAVENOUS at 21:48

## 2021-02-19 RX ADMIN — Medication 800 ML/HR: at 16:17

## 2021-02-19 RX ADMIN — CEFAZOLIN SODIUM 2 G: 2 SOLUTION INTRAVENOUS at 16:05

## 2021-02-19 RX ADMIN — HYDROMORPHONE HYDROCHLORIDE 0.5 MG: 1 INJECTION, SOLUTION INTRAMUSCULAR; INTRAVENOUS; SUBCUTANEOUS at 18:15

## 2021-02-19 RX ADMIN — PHENYLEPHRINE HYDROCHLORIDE 100 MCG: 10 INJECTION INTRAVENOUS at 16:25

## 2021-02-19 ASSESSMENT — PULMONARY FUNCTION TESTS
PIF_VALUE: 0
PIF_VALUE: 1
PIF_VALUE: 0
PIF_VALUE: 1
PIF_VALUE: 0
PIF_VALUE: 2
PIF_VALUE: 0
PIF_VALUE: 0

## 2021-02-19 ASSESSMENT — PAIN SCALES - GENERAL
PAINLEVEL_OUTOF10: 3
PAINLEVEL_OUTOF10: 5

## 2021-02-19 ASSESSMENT — ENCOUNTER SYMPTOMS
BACK PAIN: 0
RESPIRATORY NEGATIVE: 1
EYES NEGATIVE: 1
GASTROINTESTINAL NEGATIVE: 1

## 2021-02-19 NOTE — FLOWSHEET NOTE
Baby A tracing in blue on maternal left. Baby B tracing in yellow on maternal right. Mother states both babies are breech and she is supposed to have a cesearean section.

## 2021-02-19 NOTE — FLOWSHEET NOTE
Griffith to continuous drainage. Shave and chlorhexadine prep to abdomen. Compression stockings on. Admission questions. Labs pending, IV fluid bolus infusing.

## 2021-02-19 NOTE — FLOWSHEET NOTE
Pt states she was getting ready to come here for her NST as scheduled today and as she stepped into the shower she felt gush of fluid and saw bloody fluid. Denies pain or discomforts. States she called ambulance and came in. Abdomen soft and non-tender with palpation. Babies active.

## 2021-02-19 NOTE — BRIEF OP NOTE
Brief Postoperative Note      Patient: Nabil Hoover  YOB: 1990  MRN: [id number]    Date of Procedure: 2021    Pre-Op Diagnosis: twin IUP at 33 weeks 5 days. Diamniotic/dichorionic. Preeclampsia  Breech/vertex presentation  PPROM  Onset of PTL    Post-Op Diagnosis: Same with viable male and viable female fetuses  Possible small placental abruption       Procedure(s):   SECTION  Primary LSTCS  Surgeon(s):  Jolynn Smyth DO  Kalinastanislav Jain MD    Assistant:  * No surgical staff found *    Anesthesia: Spinal    Estimated Blood Loss (mL): 087    Complications: None    Specimens:   * No specimens in log *    Implants:  * No implants in log *      Drains:   Urethral Catheter Non-latex 16 fr (Active)       Findings: baby A at [phone]. Female. Apgar 8/8. Weight 3#10 oz 1642. Baby B at 1616. Viable male apgar 7/9.  Weight4#14 oz.2232 grams    Electronically signed by Jolynn Smyth DO on 2021 at 4:54 PM

## 2021-02-19 NOTE — H&P
Department of Obstetrics and Gynecology  Attending Obstetrics   History and Physical  Yair Chow    21          CHIEF COMPLAINT:  LOF    HISTORY OF PRESENT ILLNESS:      The patient is a 27 y.o. y.o. [id number] 33w5d (Estimated Date of Delivery: 21)     Patient presents with a chief complaint as above and is being admitted for PPROM. She had just got into shower and felt a gush of fluid. Red tinged and she came in by squad. No pre-e sx, no HA, no visual changes. She is feeling ctx every few min. +FM. Patient Active Problem List    Diagnosis Date Noted     premature rupture of membranes (PPROM) with unknown onset of labor 2021    Twin gestation in third trimester     Elevated BP without diagnosis of hypertension      labor in third trimester without delivery     Acute cystitis with hematuria     Dichorionic diamniotic twin pregnancy in first trimester     19 weeks gestation of pregnancy     Goiter 2012       DATES:    Last Menstrual Period:  Patient's last menstrual period was 2020. Estimated Due Date:  Estimated Date of Delivery: 21        PAST OB HISTORY     OB History    Para Term  AB Living   4 3 3 0 0 3   SAB TAB Ectopic Molar Multiple Live Births   0 0 0 0 0 3      # Outcome Date GA Lbr Gabe/2nd Weight Sex Delivery Anes PTL Lv   4 Current            3 Term 13 40w0d  10 lb (4.536 kg) M Vag-Spont  N GRETA   2 Term 05/28/10 40w0d  10 lb (4.536 kg) M Vag-Spont  N GRETA   1 Term 09 40w0d  8 lb (3.629 kg) M Vag-Spont  N GRETA        Past Gynecological History:      Last menstrual period:  Patient's last menstrual period was Patient's last menstrual period was 2020. Sexually transmitted disease history: none    Past Medical History:    No past medical history on file.   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       Social History: Social History     Socioeconomic History    Marital status: Single     Spouse name: Not on file    Number of children: Not on file    Years of education: Not on file    Highest education level: Not on file   Occupational History    Not on file   Social Needs    Financial resource strain: Not on file    Food insecurity     Worry: Not on file     Inability: Not on file    Transportation needs     Medical: Not on file     Non-medical: Not on file   Tobacco Use    Smoking status: Never Smoker    Smokeless tobacco: Never Used   Substance and Sexual Activity    Alcohol use: Never     Frequency: Never    Drug use: Never    Sexual activity: Yes     Partners: Male   Lifestyle    Physical activity     Days per week: Not on file     Minutes per session: Not on file    Stress: Not on file   Relationships    Social connections     Talks on phone: Not on file     Gets together: Not on file     Attends Confucianism service: Not on file     Active member of club or organization: Not on file     Attends meetings of clubs or organizations: Not on file     Relationship status: Not on file    Intimate partner violence     Fear of current or ex partner: Not on file     Emotionally abused: Not on file     Physically abused: Not on file     Forced sexual activity: Not on file   Other Topics Concern    Not on file   Social History Narrative    Not on file        Family History:   No family history on file.     Medications Prior to Admission:  Prescriptions Prior to Admission: Prenatal-Fe Fum-Methf-FA w/o A (VITAFOL-LINDA) 18-0.6-0.4 MG TABS, Take 1 tablet by mouth daily    Current Facility-Administered Medications:     lactated ringers infusion, , , ,     lactated ringers infusion, , Intravenous, Continuous, Tuan Mahoney MD    lactated ringers bolus, 1,000 mL, Intravenous, Once, Tuan Mahoney MD    sodium chloride flush 0.9 % injection 10 mL, 10 mL, Intravenous, 2 times per day, Tuan Mahoney MD   sodium chloride flush 0.9 % injection 10 mL, 10 mL, Intravenous, PRN, Carlos Jimenez MD    citric acid-sodium citrate (BICITRA) solution 30 mL, 30 mL, Oral, Once, Carlos Jimenez MD    ceFAZolin (ANCEF) 2000 mg in dextrose 3 % 50 mL IVPB (duplex), 2 g, Intravenous, Once, Carlos Jimenez MD    azithromycin (ZITHROMAX) 500 mg in D5W 250ml addavial, 500 mg, Intravenous, On Call to OR, Carlos Jimenez MD    Allergies:  Review of patient's allergies indicates no known allergies. REVIEW OF SYSTEMS:   Review of Systems   Constitutional: Negative. HENT: Negative. Eyes: Negative. Respiratory: Negative. Cardiovascular: Negative. Gastrointestinal: Negative. Genitourinary: Positive for vaginal bleeding. Negative for decreased urine volume, dysuria, flank pain and urgency. Musculoskeletal: Negative for arthralgias, back pain and myalgias. Neurological: Negative for dizziness, seizures, weakness and headaches. Psychiatric/Behavioral: Negative for confusion, hallucinations and self-injury. The patient is not nervous/anxious. 10-pt ROS is neg except as mentioned in HPI    PHYSICAL EXAM:    General appearance: A+O x 3. Mild distress with ctx   Neurologic:  Nonfocal, CNs II-XII grossly intact, pupils equal round and reactive,     Lungs: even, unlabored respirations. CTAB. No adventitious sounds    Heart[de-identified] No JVD, RRR, no m/r/g   Abdomen: Soft, NT, ND, gravid  no fundal tenderness   Cervix: FT/th/H    Pelvis: clinically adequate   LE: DTRs 1+ bilaterally, Trace edema bilaterally     SSE: cervix incompletely visualized, but grossly blood tinged fluid coming from os. Fetal heart rate:    A: 130s, cat 1  B: 120s, Cat        Contraction frequency:  q2-4min     Bedside US confirmed  Presentation A=breech, B=cephalic        ASSESSMENT AND PLAN:    The patient is a 27 y.o. y.o.  [id number] 33w5d (Estimated Date of Delivery: 4/4/21) admitted for urgent primary CS        - Admit to L&D

## 2021-02-19 NOTE — ANESTHESIA PRE PROCEDURE
 azithromycin (ZITHROMAX) 500 mg in D5W 250ml addavial  500 mg Intravenous On Call to Shania Resendiz MD           Allergies:  No Known Allergies    Problem List:    Patient Active Problem List   Diagnosis Code    Goiter E04.9    Acute cystitis with hematuria N30.01    Dichorionic diamniotic twin pregnancy in first trimester O32.36    19 weeks gestation of pregnancy Z3A.19    Twin gestation in third trimester O30.003    Elevated BP without diagnosis of hypertension R03.0     labor in third trimester without delivery O60.03     premature rupture of membranes (PPROM) with unknown onset of labor O37.36       Past Medical History:  No past medical history on file. 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       Social History:    Social History     Tobacco Use    Smoking status: Never Smoker    Smokeless tobacco: Never Used   Substance Use Topics    Alcohol use: Never     Frequency: Never                                Counseling given: Not Answered      Vital Signs (Current):   Vitals:    21 1328 21 1341   Resp: 18    Temp: 37.3 °C (99.2 °F)    TempSrc: Oral    Weight:  218 lb (98.9 kg)   Height:  5' 8\" (1.727 m)                                              BP Readings from Last 3 Encounters:   21 (!) 154/92   02/15/21 123/60   21 126/78       NPO Status: Time of last liquid consumption: 1215                        Time of last solid consumption: 1215                        Date of last liquid consumption: 21                        Date of last solid food consumption: 21    BMI:   Wt Readings from Last 3 Encounters:   21 218 lb (98.9 kg)   02/15/21 220 lb (99.8 kg)   21 212 lb 8 oz (96.4 kg)     Body mass index is 33.15 kg/m².     CBC:   Lab Results   Component Value Date    WBC 5.7 2021    RBC 2.72 2021    HGB 9.1 2021 HCT 27.1 02/17/2021    MCV 99.5 02/17/2021    RDW 14.4 02/17/2021     02/17/2021       CMP:   Lab Results   Component Value Date     02/17/2021    K 3.8 02/17/2021     02/17/2021    CO2 19 02/17/2021    BUN 7 02/17/2021    CREATININE 0.57 02/17/2021    GFRAA >60.0 02/17/2021    LABGLOM >60.0 02/17/2021    GLUCOSE 91 02/17/2021    PROT 5.8 02/17/2021    CALCIUM 8.6 02/17/2021    BILITOT 0.7 02/17/2021    ALKPHOS 199 02/17/2021    AST 19 02/17/2021    ALT 7 02/17/2021       POC Tests: No results for input(s): POCGLU, POCNA, POCK, POCCL, POCBUN, POCHEMO, POCHCT in the last 72 hours. Coags: No results found for: PROTIME, INR, APTT    HCG (If Applicable): No results found for: PREGTESTUR, PREGSERUM, HCG, HCGQUANT     ABGs: No results found for: PHART, PO2ART, OCY8UFW, HKZ5PYU, BEART, A0TQZVUE     Type & Screen (If Applicable):  No results found for: LABABO, LABRH    Drug/Infectious Status (If Applicable):  No results found for: HIV, HEPCAB    COVID-19 Screening (If Applicable):   Lab Results   Component Value Date    COVID19 Not Detected 02/12/2021         Anesthesia Evaluation  Patient summary reviewed and Nursing notes reviewed  Airway: Mallampati: II  TM distance: >3 FB   Neck ROM: full  Mouth opening: > = 3 FB Dental: normal exam         Pulmonary:                              Cardiovascular:                      Neuro/Psych:               GI/Hepatic/Renal:             Endo/Other:                     Abdominal:           Vascular:                                        Anesthesia Plan      spinal     ASA 2             Anesthetic plan and risks discussed with patient. Use of blood products discussed with patient whom consented to blood products.                    Renaldo Hunter, APRN - CRNA   2/19/2021

## 2021-02-19 NOTE — ANESTHESIA PROCEDURE NOTES
Spinal Block    Start time: 2/19/2021 3:57 PM  End time: 2/19/2021 3:59 PM  Reason for block: primary anesthetic  Staffing  Performed: resident/CRNA   Resident/CRNA: JOS Lara CRNA  Preanesthetic Checklist  Completed: patient identified, IV checked, risks and benefits discussed, surgical consent, monitors and equipment checked, pre-op evaluation, timeout performed, anesthesia consent given, oxygen available and patient being monitored  Spinal Block  Patient position: sitting  Prep: ChloraPrep and site prepped and draped  Patient monitoring: frequent blood pressure checks, cardiac monitor and continuous pulse ox  Approach: midline  Location: L4/L5  Provider prep: sterile gloves and mask  Local infiltration: lidocaine  Needle  Needle type: Pencan   Needle gauge: 24 G  Needle length: 3.5 in  Assessment  Sensory level: T6  Swirl obtained: Yes  CSF: clear  Attempts: 1  Hemodynamics: stable  Additional Notes  Negative heme, negative parasthesia.  atraumatic

## 2021-02-19 NOTE — FLOWSHEET NOTE
Perineal area wiped with white paper cloth. A Smear of pink from hair area. No fluid noted on chux, or on perineum or vaginal entrance. Reassurances given. Plan of care discussed with voiced understanding.

## 2021-02-19 NOTE — ANESTHESIA POSTPROCEDURE EVALUATION
Department of Anesthesiology  Postprocedure Note    Patient: Lorin Libman  MRN: [id number]  YOB: 1990  Date of evaluation: 2021  Time:  4:58 PM     Procedure Summary     Date: 21 Room / Location: [facility] OR [address] [place] [address]    Anesthesia Start: [address] Anesthesia Stop: 9035    Procedure:  SECTION (N/A ) Diagnosis: (twins)    Surgeons: Pati Stoll DO Responsible Provider: JOS Cavazos CRNA    Anesthesia Type: spinal ASA Status: 2          Anesthesia Type: spinal    Tamara Phase I: Tamara Score: 9    Tamara Phase II:      Last vitals: Reviewed and per EMR flowsheets.        Anesthesia Post Evaluation    Patient location during evaluation: floor  Patient participation: complete - patient participated  Level of consciousness: awake and alert  Pain score: 0  Airway patency: patent  Nausea & Vomiting: no nausea and no vomiting  Complications: no  Cardiovascular status: blood pressure returned to baseline  Respiratory status: acceptable, nonlabored ventilation, spontaneous ventilation and room air  Hydration status: euvolemic

## 2021-02-19 NOTE — FLOWSHEET NOTE
Jerone Cowden finished with assessments. He spoke with CARTER, Mariam Chandler and Dr. Gisel Mirza. States  is coming in and will do primary c/s. Orders received.

## 2021-02-20 LAB
HEMOGLOBIN: 9.4 G/DL (ref 12–16)
RPR: NORMAL

## 2021-02-20 PROCEDURE — 6370000000 HC RX 637 (ALT 250 FOR IP): Performed by: OBSTETRICS & GYNECOLOGY

## 2021-02-20 PROCEDURE — 36415 COLL VENOUS BLD VENIPUNCTURE: CPT

## 2021-02-20 PROCEDURE — 6360000002 HC RX W HCPCS: Performed by: OBSTETRICS & GYNECOLOGY

## 2021-02-20 PROCEDURE — 85018 HEMOGLOBIN: CPT

## 2021-02-20 PROCEDURE — 99024 POSTOP FOLLOW-UP VISIT: CPT | Performed by: OBSTETRICS & GYNECOLOGY

## 2021-02-20 PROCEDURE — 1220000000 HC SEMI PRIVATE OB R&B

## 2021-02-20 RX ORDER — SODIUM CHLORIDE, SODIUM LACTATE, POTASSIUM CHLORIDE, CALCIUM CHLORIDE 600; 310; 30; 20 MG/100ML; MG/100ML; MG/100ML; MG/100ML
INJECTION, SOLUTION INTRAVENOUS CONTINUOUS
Status: DISCONTINUED | OUTPATIENT
Start: 2021-02-20 | End: 2021-02-22 | Stop reason: HOSPADM

## 2021-02-20 RX ORDER — IBUPROFEN 800 MG/1
800 TABLET ORAL EVERY 8 HOURS
Status: DISCONTINUED | OUTPATIENT
Start: 2021-02-20 | End: 2021-02-22 | Stop reason: HOSPADM

## 2021-02-20 RX ADMIN — IBUPROFEN 800 MG: 800 TABLET ORAL at 20:21

## 2021-02-20 RX ADMIN — LABETALOL HYDROCHLORIDE 200 MG: 200 TABLET, FILM COATED ORAL at 20:21

## 2021-02-20 RX ADMIN — KETOROLAC TROMETHAMINE 30 MG: 30 INJECTION, SOLUTION INTRAMUSCULAR; INTRAVENOUS at 05:28

## 2021-02-20 RX ADMIN — DOCUSATE SODIUM 100 MG: 100 CAPSULE, LIQUID FILLED ORAL at 08:30

## 2021-02-20 RX ADMIN — LABETALOL HYDROCHLORIDE 200 MG: 200 TABLET, FILM COATED ORAL at 08:30

## 2021-02-20 RX ADMIN — LABETALOL HYDROCHLORIDE 200 MG: 200 TABLET, FILM COATED ORAL at 14:31

## 2021-02-20 RX ADMIN — OXYCODONE HYDROCHLORIDE AND ACETAMINOPHEN 2 TABLET: 5; 325 TABLET ORAL at 23:17

## 2021-02-20 RX ADMIN — OXYCODONE HYDROCHLORIDE AND ACETAMINOPHEN 2 TABLET: 5; 325 TABLET ORAL at 16:47

## 2021-02-20 RX ADMIN — IBUPROFEN 800 MG: 800 TABLET ORAL at 12:20

## 2021-02-20 RX ADMIN — DOCUSATE SODIUM 100 MG: 100 CAPSULE, LIQUID FILLED ORAL at 20:21

## 2021-02-20 RX ADMIN — MAGNESIUM SULFATE HEPTAHYDRATE 2 G/HR: 40 INJECTION, SOLUTION INTRAVENOUS at 05:22

## 2021-02-20 RX ADMIN — BISACODYL 10 MG: 10 SUPPOSITORY RECTAL at 20:25

## 2021-02-20 ASSESSMENT — PAIN SCALES - GENERAL: PAINLEVEL_OUTOF10: 6

## 2021-02-20 NOTE — PLAN OF CARE
Description: Absence of signs or symptoms of impaired coagulation  Outcome: Ongoing     Problem: Coping:  Goal: Coping behaviors will improve  Description: Coping behaviors will improve  Outcome: Ongoing  Goal: Ability to verbalize feelings will improve  Description: Ability to verbalize feelings will improve  Outcome: Ongoing     Problem: Fluid Volume:  Goal: Will maintain adequate fluid volume  Description: Will maintain adequate fluid volume  Outcome: Ongoing  Goal: Ability to achieve and maintain adequate urine output will improve  Description: Ability to achieve and maintain adequate urine output will improve  Outcome: Ongoing  Goal: Peripheral tissue perfusion will improve  Description: Peripheral tissue perfusion will improve  Outcome: Ongoing     Problem: Life Cycle:  Goal: Chance of risk for complications during labor will decrease  Description: Chance of risk for complications during labor will decrease  Outcome: Ongoing  Goal: Will show no signs and symptoms of excessive bleeding  Description: Will show no signs and symptoms of excessive bleeding  Outcome: Ongoing     Problem: Physical Regulation:  Goal: Risk for medication side effects will decrease  Description: Risk for medication side effects will decrease  Outcome: Ongoing  Goal: Will remain free of preeclampsia complications  Description: Will remain free of preeclampsia complications  Outcome: Ongoing  Goal: Signs of adequate cerebral perfusion will increase  Description: Signs of adequate cerebral perfusion will increase  Outcome: Ongoing     Problem: Sensory:  Goal: Ability to compensate for vision loss will improve  Description: Ability to compensate for vision loss will improve  Outcome: Ongoing

## 2021-02-20 NOTE — PROGRESS NOTES
Called Dr. Tariq Velasco about BP of 160/96 and 15min repeat being 155/96. Orders to repeat in 10 min and if higher than [id number] and diastolic 566 to give 20 labetalol IV.  If not call her back with BP.

## 2021-02-20 NOTE — FLOWSHEET NOTE
Called Dr. Porfirio Huber to clarify orders. Asked if she wants Pitocin ran with the [address].  States \" to leave it off but if she begins to bleed or uterus becomes boggy\"

## 2021-02-20 NOTE — PROGRESS NOTES
Dr Ramirez Lat notified of pt bp's , orders received to start magnesium sulfate bolus, and severe bp algorhythm

## 2021-02-20 NOTE — PROGRESS NOTES
Section Post Partum Progress Note    Subjective:  Patient concerns: none at this time  Lochia is mild. Her pain is mild and controlled with current medications. She is tolerating diet, has not ambulated yet, and urinating without difficult. Denies CP/SOB, F/C, N/V, HA, or leg pain. Objective:  Vitals:    21   BP: 129/76   Pulse: 59   Resp: 16   Temp:    SpO2: 99%     BP Readings from Last 3 Encounters:   21 129/76   21 (!) 104/53   21 (!) 154/92       General: alert, cooperative, no distress  Heart: RRR, normal s1/s2  Lungs: CTAB  Bowel Sounds: active  Uterine Fundus: Firm and nontender. Fundus below umbilicus. Incision: dressing in place   DVT Evaluation: No evidence of DVT seen on physical exam. Negative Casandra's sign.     Labs:   Lab Results   Component Value Date    WBC 6.8 2021    RBC 3.01 2021    HGB 9.4 2021    HCT 30.0 2021    MCV 99.7 2021    MCHC 33.4 2021    RDW 14.6 2021     2021    LYMPHSABS 1.7 2021    MONOSABS 0.6 2021    EOSABS 0.0 2021       Medications:    Infusions:    lactated ringers 75 mL/hr at 21 0601    magnesium sulfate 2 g/hr (21 0831)       Scheduled:    sodium chloride flush  10 mL Intravenous 2 times per day    [START ON 2021] ibuprofen  800 mg Oral Q8H    ketorolac  30 mg Intravenous Q6H    rho(D) immune globulin  300 mcg Intramuscular Once    docusate sodium  100 mg Oral BID    Tdap-Dtap  0.5 mL Intramuscular Prior to discharge    labetalol  200 mg Oral TID       PRN: sodium chloride flush, oxyCODONE-acetaminophen **OR** oxyCODONE-acetaminophen, ondansetron, bisacodyl, lanolin, HYDROmorphone, calcium gluconate    Assessment and Plan: Fransisco Her is a 27 y.o. [id number] POD #1 from CS with no complications. Pregnancy and postpartum complicated by severe preE requiring acute treatment. Currently on Magnesium, to be continued until 24 hours postpartum which will be around 1600 today. On Labetalol PO daily. Was also on Procardia prior to delivery, may need to add this in as well. Bps okay at this time. Will continue to observe. Okay to discontinue bailey. Continue routine postpartum care. Plan for discharge POD#3/4    Javid Roth M.D.   Obstetrics and Gynecology  02/20/21, 8:55 AM

## 2021-02-20 NOTE — PROGRESS NOTES
Pt magnesium sulfate discontinued, bailey discontinued and iv to heplock. pericare done and pt instucted to call before getting oob.

## 2021-02-20 NOTE — PROGRESS NOTES
Dr. Nancy Talavera called back.  States \" ok to keep pit off as long as bleeding and fundus are wnl.\"

## 2021-02-20 NOTE — PROGRESS NOTES
Pt assisted to sit on side of bed per request. Pt instructed to not get oob d/t possible lightheadedness, magnesium sulfate, bailey catheter in place. Pt would like to sit up on side of bed to eat lunch.  Pt medicated with motrin for headache

## 2021-02-20 NOTE — FLOWSHEET NOTE
Called Dr. Adrien Nuñez with BP results. Informed Dr. Nathalie Cadet she was on labetalol 200MG PO TID and Procardia PO once a day at home. Orders to restart these meds.

## 2021-02-20 NOTE — PROGRESS NOTES
Spoke to Dr Jamaal Hoover re: pt bp's, orders received, ok  discontinue magnesium sulfate at  24 hours or when current bag is finished. Ok to d/c bailey and ivf at that time also.

## 2021-02-20 NOTE — OP NOTE
[address]                      [address], [address] [facility]                                OPERATIVE REPORT    PATIENT NAME: Florencio Templeton                     :        1990  MED REC NO:   [id number]                            ROOM:       2064  ACCOUNT NO:   [de-identified]                           ADMIT DATE: 2021  PROVIDER:     Anna Estrella DO    DATE OF PROCEDURE:  2021    PREOPERATIVE DIAGNOSES:  Twin intrauterine gestation at 33 weeks and 5  days. Diamniotic dichorionic gestation with a breech vertex  presentation.  premature rupture of membranes and onset of   labor and preeclampsia. PREOPERATIVE DIAGNOSES:  Twin intrauterine gestation at 33 weeks and 5  days. Diamniotic dichorionic gestation with a breech vertex  presentation.  premature rupture of membranes and onset of   labor and preeclampsia with the delivery of a viable male fetus  and a viable female fetus and a possible small placental edge abruption. PROCEDURE:  Primary low segment transverse  section. SURGEON:  Lolis Lawrence DO    ASSISTANT:  Dr. Giovanna Olsen. COMPLICATIONS:  None. BLOOD LOSS:  500 mL. INDICATIONS:  This is a 25-year-old female  4, para 3-0-0-3. She  has had three full-term spontaneous vaginal deliveries. With this  pregnancy, she has a twin gestation, diamniotic dichorionic male and a  female fetus, most recent presentation is breech and vertex. The  patient has been followed closely due to gestational hypertension and  came in today at 33 weeks and 5 days after her membranes ruptured  earlier today for blood-tinged fluid. She was having regular uterine  contractions. The fetal heart rate tracings were reactive and category  1.   Due to the breech vertex presentation and the current concern for  interlocking twins, the decision was made to proceed with  section. The potential complications of the procedure; bleeding;  infection; medication reaction; bowel, bladder, ureter injury or  vascular complications; were explained to her in detail. Appropriate  consent was obtained and was placed on the chart. DESCRIPTION OF PROCEDURE:  The patient was taken to the operating room  where a spinal anesthetic was administered. She was then placed supine  on the operating room table with appropriate left uterine displacement. Prepped and draped in usual fashion. Bladder was drained with a Griffith  catheter. Time-out was called. Anesthetic levels were checked and  deemed to be appropriate to proceed. Transverse suprapubic incision was  made and then the abdomen was opened through a Pfannenstiel incision. The peritoneum was entered bluntly and retracted laterally. The  vesicouterine reflection was identified, elevated and incised. The  bladder was  from the lower uterine segment. The myometrium  was scored. The intrauterine cavity was entered bluntly. Clear fluid  was encountered. The myometrial incision was extended bilaterally. Baby A was in the breech presentation, the breech was grasped and  delivered followed by the legs, the arms were then swept across the  chest and head was delivered in flexion. After a short delay, the cord  was clamped and cut. The baby was taken to the warmer and evaluated by  nursing staff and the pediatrician as well as respiratory therapy. It  was a grossly normal female. She was born at 16:14. Apgars were 8 and  8 and the birth weight was 1642 gm or 3 pounds 10 ounces. The membranes  around baby B were then ruptured, baby was transverse. The feet were  grasped and brought into the area of the incision and the baby was then  delivered from a footling breech presentation. The arms were swept  across the chest.  The head was delivered in flexion.   Time of birth was 16:16.  After short delay, the cord was clamped and cut and baby was  taken to the warmer and evaluated. Apgars were 7 and 9. Birth weight  was 4 pounds 14 ounces or 2032 gm. Cord bloods were obtained from each  cord and then the placenta was manually removed from the intrauterine  cavity which was then wiped free of any retained placental tissue. She  received Pitocin and adequate contraction of the uterus occurred. The  uterus was exteriorized for repair. The fallopian tubes and ovaries  bilaterally were normal as was the uterine contour. The myometrium was  then closed using a double layer closure, first layer running locking,  second layer imbricating. Two figure-of-eight stitches were required to  achieve hemostasis. The uterus was then returned to its pelvic  location. The pelvis was copiously irrigated, cleansed of any debris  and blood clots. Surgicel was then sprayed across the hemostatic  myometrial incision line. First sponge, needle and instrument counts  were correct. The peritoneum was closed, the fascia was closed with #1  Vicryl suture. Subcutaneous fat was closed and then the skin edges were  reapproximated with Insorb staples. The incision was then cleansed and  appropriately dressed. Uterus was Crede'd free of clots and irrigating  solution. The patient was then taken to the recovery room in good  condition. She tolerated the surgery and the anesthesia well. Blood  loss was 500 mL. Sponge, needle and instrument counts were correct. Babies are doing well and are stable at this time and mom is doing well  also.         Noe Sanchez DO    D: 02/19/2021 17:00:19       T: 02/19/2021 17:09:41     [email or web address]  Job#: [id number]     Doc#: [id number]    CC:

## 2021-02-20 NOTE — PLAN OF CARE
Problem: Pain:  Goal: Pain level will decrease  Description: Pain level will decrease  2/20/2021 0955 by Oly Appiah RN  Outcome: Ongoing  2/19/2021 2322 by Chris Corrigan RN  Outcome: Ongoing  Goal: Control of acute pain  Description: Control of acute pain  2/20/2021 0955 by Oly Appiah RN  Outcome: Ongoing  2/19/2021 2322 by Chris Corrigan RN  Outcome: Ongoing  Goal: Control of chronic pain  Description: Control of chronic pain  2/20/2021 0955 by Oly Appiah RN  Outcome: Completed  2/19/2021 2322 by Chris Corrigan RN  Outcome: Ongoing     Problem: Skin Integrity:  Goal: Will show no infection signs and symptoms  Description: Will show no infection signs and symptoms  2/20/2021 0955 by Oly Appiah RN  Outcome: Ongoing  2/19/2021 2322 by Chris Corrigan RN  Outcome: Ongoing  Goal: Absence of new skin breakdown  Description: Absence of new skin breakdown  2/20/2021 0955 by Oly Appiah RN  Outcome: Ongoing  2/19/2021 2322 by Chris Corrigan RN  Outcome: Ongoing     Problem: Discharge Planning:  Goal: Discharged to appropriate level of care  Description: Discharged to appropriate level of care  2/20/2021 0955 by Oly Appaih RN  Outcome: Ongoing  2/19/2021 2322 by Chris Corrigan RN  Outcome: Ongoing     Problem: Fluid Volume - Imbalance:  Goal: Absence of postpartum hemorrhage signs and symptoms  Description: Absence of postpartum hemorrhage signs and symptoms  2/20/2021 0955 by Oly Appiah RN  Outcome: Ongoing  2/19/2021 2322 by Chris Corrigan RN  Outcome: Ongoing  Goal: Absence of imbalanced fluid volume signs and symptoms  Description: Absence of imbalanced fluid volume signs and symptoms  2/20/2021 0955 by Oly Appiah RN  Outcome: Ongoing  2/19/2021 2322 by Chris Corrigan RN  Outcome: Ongoing     Problem: Infection - Surgical Site:  Goal: Will show no infection signs and symptoms  Description: Will show no infection signs and symptoms 2/20/2021 0955 by Amairani Aguilar RN  Outcome: Ongoing  2/19/2021 2322 by Shan Sharif RN  Outcome: Ongoing     Problem: Mood - Altered:  Goal: Mood stable  Description: Mood stable  2/20/2021 0955 by Amairani Aguilar RN  Outcome: Ongoing  2/19/2021 2322 by Shan Sharif RN  Outcome: Ongoing     Problem: Nausea/Vomiting:  Goal: Absence of nausea/vomiting  Description: Absence of nausea/vomiting  2/20/2021 0955 by Amairani Aguilar RN  Outcome: Completed  2/19/2021 2322 by Shan Sharif RN  Outcome: Ongoing     Problem: Pain - Acute:  Goal: Pain level will decrease  Description: Pain level will decrease  2/20/2021 0955 by Amairani Aguilar RN  Outcome: Ongoing  2/19/2021 2322 by Shan Sharif RN  Outcome: Ongoing     Problem: Urinary Retention:  Goal: Urinary elimination within specified parameters  Description: Urinary elimination within specified parameters  2/20/2021 0955 by Amairani Aguilar RN  Outcome: Ongoing  2/19/2021 2322 by Shan Sharif RN  Outcome: Ongoing     Problem: Venous Thromboembolism:  Goal: Will show no signs or symptoms of venous thromboembolism  Description: Will show no signs or symptoms of venous thromboembolism  2/20/2021 0955 by Amairani Aguilar RN  Outcome: Ongoing  2/19/2021 2322 by Shan Sharif RN  Outcome: Ongoing  Goal: Absence of signs or symptoms of impaired coagulation  Description: Absence of signs or symptoms of impaired coagulation  2/20/2021 0955 by Amairani Aguilar RN  Outcome: Ongoing  2/19/2021 2322 by Shan Sharif RN  Outcome: Ongoing     Problem: Coping:  Goal: Coping behaviors will improve  Description: Coping behaviors will improve  2/20/2021 0955 by Amairani Aguilar RN  Outcome: Completed  2/19/2021 2322 by Shan Sharif RN  Outcome: Ongoing  Goal: Ability to verbalize feelings will improve  Description: Ability to verbalize feelings will improve  2/20/2021 0955 by Amairani Aguilar RN  Outcome: Completed

## 2021-02-21 PROCEDURE — 6370000000 HC RX 637 (ALT 250 FOR IP): Performed by: OBSTETRICS & GYNECOLOGY

## 2021-02-21 PROCEDURE — 1220000000 HC SEMI PRIVATE OB R&B

## 2021-02-21 RX ORDER — NIFEDIPINE 30 MG/1
30 TABLET, EXTENDED RELEASE ORAL DAILY
Status: DISCONTINUED | OUTPATIENT
Start: 2021-02-22 | End: 2021-02-22 | Stop reason: HOSPADM

## 2021-02-21 RX ORDER — NIFEDIPINE 30 MG/1
30 TABLET, EXTENDED RELEASE ORAL ONCE
Status: COMPLETED | OUTPATIENT
Start: 2021-02-21 | End: 2021-02-21

## 2021-02-21 RX ADMIN — DOCUSATE SODIUM 100 MG: 100 CAPSULE, LIQUID FILLED ORAL at 08:04

## 2021-02-21 RX ADMIN — LABETALOL HYDROCHLORIDE 200 MG: 200 TABLET, FILM COATED ORAL at 08:04

## 2021-02-21 RX ADMIN — IBUPROFEN 800 MG: 800 TABLET ORAL at 04:28

## 2021-02-21 RX ADMIN — DOCUSATE SODIUM 100 MG: 100 CAPSULE, LIQUID FILLED ORAL at 21:46

## 2021-02-21 RX ADMIN — LABETALOL HYDROCHLORIDE 200 MG: 200 TABLET, FILM COATED ORAL at 21:45

## 2021-02-21 RX ADMIN — NIFEDIPINE 30 MG: 30 TABLET, EXTENDED RELEASE ORAL at 22:36

## 2021-02-21 RX ADMIN — OXYCODONE HYDROCHLORIDE AND ACETAMINOPHEN 2 TABLET: 5; 325 TABLET ORAL at 10:52

## 2021-02-21 RX ADMIN — IBUPROFEN 800 MG: 800 TABLET ORAL at 21:45

## 2021-02-21 RX ADMIN — LABETALOL HYDROCHLORIDE 200 MG: 200 TABLET, FILM COATED ORAL at 14:45

## 2021-02-21 RX ADMIN — IBUPROFEN 800 MG: 800 TABLET ORAL at 14:45

## 2021-02-21 RX ADMIN — OXYCODONE HYDROCHLORIDE AND ACETAMINOPHEN 2 TABLET: 5; 325 TABLET ORAL at 21:45

## 2021-02-21 ASSESSMENT — ENCOUNTER SYMPTOMS
ABDOMINAL PAIN: 0
SHORTNESS OF BREATH: 0
SORE THROAT: 0
COUGH: 0
NAUSEA: 0

## 2021-02-21 ASSESSMENT — PAIN SCALES - GENERAL
PAINLEVEL_OUTOF10: 5
PAINLEVEL_OUTOF10: 4
PAINLEVEL_OUTOF10: 3

## 2021-02-21 NOTE — PLAN OF CARE
Problem: Skin Integrity:  Goal: Will show no infection signs and symptoms  Description: Will show no infection signs and symptoms  Outcome: Met This Shift  Goal: Absence of new skin breakdown  Description: Absence of new skin breakdown  Outcome: Met This Shift     Problem: Infection - Surgical Site:  Goal: Will show no infection signs and symptoms  Description: Will show no infection signs and symptoms  Outcome: Met This Shift     Problem: Mood - Altered:  Goal: Mood stable  Description: Mood stable  Outcome: Met This Shift     Problem: Urinary Retention:  Goal: Urinary elimination within specified parameters  Description: Urinary elimination within specified parameters  Outcome: Met This Shift

## 2021-02-21 NOTE — PROGRESS NOTES
Progress Note  Date:2021       Room:0326/0326-01  Patient Lizeth Kwok     YOB: 1990     Age:31 y.o. Subjective    Subjective:  Symptoms:  Stable. No shortness of breath, cough or chest pain. Diet:  Adequate intake. No nausea. Activity level: Normal.    Pain:  She reports no pain. Review of Systems   Constitutional: Negative for fever. HENT: Negative for sore throat. Respiratory: Negative for cough and shortness of breath. Cardiovascular: Negative for chest pain. Gastrointestinal: Negative for abdominal pain and nausea. Genitourinary: Negative for difficulty urinating. Neurological: Negative for dizziness. All other systems reviewed and are negative. Objective         Vitals Last 24 Hours:  TEMPERATURE:  Temp  Av.2 °F (36.8 °C)  Min: 97.9 °F (36.6 °C)  Max: 98.4 °F (36.9 °C)  RESPIRATIONS RANGE: Resp  Av.4  Min: 16  Max: 18  PULSE OXIMETRY RANGE: SpO2  Av.7 %  Min: 94 %  Max: 100 %  PULSE RANGE: Pulse  Av.8  Min: 61  Max: 77  BLOOD PRESSURE RANGE: Systolic (83ESX), JVU:218 , Min:114 , NTY:610   ; Diastolic (61OIX), KP, Min:60, Max:87    I/O (24Hr): Intake/Output Summary (Last 24 hours) at 2021 1576  Last data filed at 2021 2200  Gross per 24 hour   Intake 50 ml   Output 1600 ml   Net -1550 ml     Objective:  General Appearance:  Comfortable, well-appearing and in no acute distress. Vital signs: (most recent): Blood pressure (!) 145/87, pulse 77, temperature 98.4 °F (36.9 °C), temperature source Oral, resp. rate 18, height 5' 8\" (1.727 m), weight 218 lb (98.9 kg), last menstrual period 2020, SpO2 98 %, unknown if currently breastfeeding. Vital signs are normal.  No fever. Output: Producing urine. HEENT: Normal HEENT exam.    Lungs:  Normal effort. Heart: Normal rate. Abdomen: Abdomen is soft. (Incision clean dry intact). There is no abdominal tenderness.    (Uterine fundus firm Lochia minimal). Neurological: Patient is alert. Skin:  Warm and dry. Labs/Imaging/Diagnostics    Labs:  CBC:  Recent Labs     21  1515 21  0515   WBC 6.8  --    RBC 3.01*  --    HGB 10.0* 9.4*   HCT 30.0*  --    MCV 99.7  --    RDW 14.6*  --      --      CHEMISTRIES:  Recent Labs     21  1515      K 3.9      CO2 22   BUN 7   CREATININE 0.56   GLUCOSE 85     PT/INR:No results for input(s): PROTIME, INR in the last 72 hours. APTT:No results for input(s): APTT in the last 72 hours. LIVER PROFILE:  Recent Labs     21  1515   AST 28   ALT 7   BILITOT 0.9*   ALKPHOS 213*       Imaging Last 24 Hours:  No results found. Assessment//Plan           Hospital Problems           Last Modified POA     premature rupture of membranes (PPROM) with unknown onset of labor 2021 Yes        Assessment:    Condition: In stable condition. (29 yo P5 s/p CD for twins POD2  S/p Magnesium sulfate for preeclampsia). Plan:   Encourage ambulation. Regular diet. (Continue close monitoring  Continue PO Labetalol).        Electronically signed by Earle Lacey DO on 21 at 8:52 AM EST

## 2021-02-22 VITALS
HEART RATE: 86 BPM | WEIGHT: 218 LBS | RESPIRATION RATE: 16 BRPM | DIASTOLIC BLOOD PRESSURE: 89 MMHG | BODY MASS INDEX: 33.04 KG/M2 | HEIGHT: 68 IN | OXYGEN SATURATION: 98 % | SYSTOLIC BLOOD PRESSURE: 134 MMHG | TEMPERATURE: 98 F

## 2021-02-22 LAB
GROUP B STREP CULTURE: ABNORMAL
ORGANISM: ABNORMAL

## 2021-02-22 PROCEDURE — 6370000000 HC RX 637 (ALT 250 FOR IP): Performed by: OBSTETRICS & GYNECOLOGY

## 2021-02-22 PROCEDURE — 99238 HOSP IP/OBS DSCHRG MGMT 30/<: CPT | Performed by: OBSTETRICS & GYNECOLOGY

## 2021-02-22 RX ORDER — OXYCODONE HYDROCHLORIDE AND ACETAMINOPHEN 5; 325 MG/1; MG/1
1 TABLET ORAL EVERY 6 HOURS PRN
Qty: 20 TABLET | Refills: 0 | Status: SHIPPED | OUTPATIENT
Start: 2021-02-22 | End: 2021-02-27

## 2021-02-22 RX ORDER — IBUPROFEN 600 MG/1
600 TABLET ORAL 4 TIMES DAILY PRN
Qty: 40 TABLET | Refills: 0 | Status: SHIPPED | OUTPATIENT
Start: 2021-02-22

## 2021-02-22 RX ADMIN — IBUPROFEN 800 MG: 800 TABLET ORAL at 04:53

## 2021-02-22 RX ADMIN — DOCUSATE SODIUM 100 MG: 100 CAPSULE, LIQUID FILLED ORAL at 09:36

## 2021-02-22 RX ADMIN — LABETALOL HYDROCHLORIDE 200 MG: 200 TABLET, FILM COATED ORAL at 05:33

## 2021-02-22 RX ADMIN — IBUPROFEN 800 MG: 800 TABLET ORAL at 11:26

## 2021-02-22 RX ADMIN — NIFEDIPINE 30 MG: 30 TABLET, EXTENDED RELEASE ORAL at 10:52

## 2021-02-22 ASSESSMENT — PAIN SCALES - GENERAL
PAINLEVEL_OUTOF10: 0
PAINLEVEL_OUTOF10: 4

## 2021-02-22 NOTE — FLOWSHEET NOTE
Discharge instructions explained to patient, verbalized understanding. Denies any needs at this time. Discharge to boarder status, room 332. Patient will be picking up her pain medications at [facility] today.

## 2021-02-22 NOTE — PLAN OF CARE
Problem: Pain:  Description: Pain management should include both nonpharmacologic and pharmacologic interventions.   Goal: Pain level will decrease  Description: Pain level will decrease  Outcome: Completed  Goal: Control of acute pain  Description: Control of acute pain  Outcome: Completed     Problem: Skin Integrity:  Goal: Will show no infection signs and symptoms  Description: Will show no infection signs and symptoms  Outcome: Completed  Goal: Absence of new skin breakdown  Description: Absence of new skin breakdown  Outcome: Completed     Problem: Discharge Planning:  Goal: Discharged to appropriate level of care  Description: Discharged to appropriate level of care  Outcome: Completed     Problem: Fluid Volume - Imbalance:  Goal: Absence of postpartum hemorrhage signs and symptoms  Description: Absence of postpartum hemorrhage signs and symptoms  Outcome: Completed  Goal: Absence of imbalanced fluid volume signs and symptoms  Description: Absence of imbalanced fluid volume signs and symptoms  Outcome: Completed     Problem: Infection - Surgical Site:  Goal: Will show no infection signs and symptoms  Description: Will show no infection signs and symptoms  Outcome: Completed     Problem: Mood - Altered:  Goal: Mood stable  Description: Mood stable  Outcome: Completed     Problem: Pain - Acute:  Goal: Pain level will decrease  Description: Pain level will decrease  Outcome: Completed     Problem: Urinary Retention:  Goal: Urinary elimination within specified parameters  Description: Urinary elimination within specified parameters  Outcome: Completed     Problem: Venous Thromboembolism:  Goal: Will show no signs or symptoms of venous thromboembolism  Description: Will show no signs or symptoms of venous thromboembolism  Outcome: Completed  Goal: Absence of signs or symptoms of impaired coagulation  Description: Absence of signs or symptoms of impaired coagulation  Outcome: Completed     Problem: Fluid Volume: Goal: Will maintain adequate fluid volume  Description: Will maintain adequate fluid volume  Outcome: Completed  Goal: Ability to achieve and maintain adequate urine output will improve  Description: Ability to achieve and maintain adequate urine output will improve  Outcome: Completed

## 2021-02-22 NOTE — DISCHARGE SUMMARY
!! - Potential duplicate medications found. Please discuss with provider. No discharge procedures on file.     Discharge to: Home  Discharge with Labetalol 200mg tid   Procardia XL30mg  Percocet  Motrin  FU 1 week      Discharge Date: February 22, 2021      [facility]  February 22, 2021

## 2021-02-22 NOTE — FLOWSHEET NOTE
Dr Dale [place] notified of recent blood pressures 140/82, 156/88, and after reassessing 141/82. New orders for procardia xl 30mg one odse now and then daily. Patient updated and verbalizes understanding.

## 2021-02-22 NOTE — FLOWSHEET NOTE
Call placed to Dr Joe Parham regarding recent BP's 159/98, 155/99 as well as reassessment BP's of 164/82, 152/88. New orders to give Labetelol 200mg PO scheduled for 0800 now and reassess blood pressure at least an hour after medicating. Updated patient on new orders and patient verbalizes understanding.

## 2021-06-21 NOTE — FLOWSHEET NOTE
EFM explained and applied. Twin A located mid suprapubic tracing blue and Twin B located right upper quad tracing yellow. Audibile fetal movement noted. Pt states that her contractions started around 9pm last night. Denies any vaginal bleeding or fluid loss and no recent intercourse. CB

## 2021-09-24 ENCOUNTER — HOSPITAL ENCOUNTER (EMERGENCY)
Age: 31
Discharge: HOME | End: 2021-09-24
Payer: COMMERCIAL

## 2021-09-24 VITALS
SYSTOLIC BLOOD PRESSURE: 117 MMHG | BODY MASS INDEX: 27.28 KG/M2 | WEIGHT: 180 LBS | RESPIRATION RATE: 16 BRPM | HEIGHT: 68 IN | OXYGEN SATURATION: 100 % | HEART RATE: 86 BPM | TEMPERATURE: 97.4 F | DIASTOLIC BLOOD PRESSURE: 86 MMHG

## 2021-09-24 DIAGNOSIS — T78.40XA ALLERGY, INITIAL ENCOUNTER: Primary | ICD-10-CM

## 2021-09-24 PROCEDURE — 96374 THER/PROPH/DIAG INJ IV PUSH: CPT

## 2021-09-24 PROCEDURE — 6360000002 HC RX W HCPCS

## 2021-09-24 PROCEDURE — 99284 EMERGENCY DEPT VISIT MOD MDM: CPT

## 2021-09-24 RX ORDER — DEXAMETHASONE SODIUM PHOSPHATE 4 MG/ML
10 INJECTION, SOLUTION INTRA-ARTICULAR; INTRALESIONAL; INTRAMUSCULAR; INTRAVENOUS; SOFT TISSUE ONCE
Status: COMPLETED | OUTPATIENT
Start: 2021-09-24 | End: 2021-09-24

## 2021-09-24 RX ADMIN — DEXAMETHASONE SODIUM PHOSPHATE 10 MG: 4 INJECTION, SOLUTION INTRAMUSCULAR; INTRAVENOUS at 11:05

## 2021-09-24 NOTE — ED TRIAGE NOTES
Pt c/o eye irritation that started Wednesday. Left eye is swollen and red. Pt denies trauma. Pt is A&Ox4, skin intact, msps intact, afebrile, breaths are equal and unlabored.

## 2021-09-24 NOTE — ED NOTES
Pt stable, 0 c/o, 0 distress, per [facility] pa ok to d/c pt home.      Angelic Chow RN  09/24/21 3234

## 2021-09-24 NOTE — ED PROVIDER NOTES
[address] [facility] ED  EMERGENCY DEPARTMENT ENCOUNTER      Pt Name: Grace Nicole  MRN: [id number]  [place] 1990  Date of evaluation: 2021  Provider: Cassie Flores, [address] [place] [address]       Chief Complaint   Patient presents with    Eye Problem         HISTORY OF PRESENT ILLNESS   (Location/Symptom, Timing/Onset, Context/Setting, Quality, Duration, Modifying Factors, Severity)  Note limiting factors. Grace Nicole is a 27 y.o. female who presents to the emergency department for swelling to the infraorbital area of her left eye for the past couple days. She also complains of itching. She thinks that she has pinkeye. No new make-up or external factors. No medications she has tried. Denies vision changes or pain to move her eyeballs. Denies any issues with her actual eyeball. HPI    Nursing Notes were reviewed. REVIEW OF SYSTEMS    (2-9 systems for level 4, 10 or more for level 5)     Review of Systems    Except as noted above the remainder of the review of systems was reviewed and negative. PAST MEDICAL HISTORY   History reviewed. No pertinent past medical history. 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 IBUPROFEN (ADVIL;MOTRIN) 600 MG TABLET    Take 1 tablet by mouth 4 times daily as needed for Pain    LABETALOL (NORMODYNE) 200 MG TABLET    Take 1 tablet by mouth 2 times daily    NIFEDIPINE (PROCARDIA XL) 30 MG EXTENDED RELEASE TABLET    Take 1 tablet by mouth daily    NIFEDIPINE (PROCARDIA XL) 30 MG EXTENDED RELEASE TABLET    Take 1 tablet by mouth daily    PRENATAL VIT-DSS-FE CBN-FA (PRENATAL AD PO)    Take  by mouth. ALLERGIES     Patient has no known allergies. FAMILY HISTORY       Family History   Problem Relation Age of Onset    Arthritis Mother     High Blood Pressure Mother     High Cholesterol Mother     Osteoporosis Mother     Diabetes Sister     High Blood Pressure Sister     Arthritis Maternal Grandmother     Diabetes Maternal Grandmother     Heart Disease Maternal Grandmother           SOCIAL HISTORY       Social History     Socioeconomic History    Marital status: Single     Spouse name: None    Number of children: None    Years of education: None    Highest education level: None   Occupational History    None   Tobacco Use    Smoking status: Never Smoker    Smokeless tobacco: Never Used   Vaping Use    Vaping Use: Never used   Substance and Sexual Activity    Alcohol use: Never    Drug use: Never    Sexual activity: Yes     Partners: Male   Other Topics Concern    None   Social History Narrative    None     Social Determinants of Health     Financial Resource Strain:     Difficulty of Paying Living Expenses:    Food Insecurity:     Worried About Running Out of Food in the Last Year:     Ran Out of Food in the Last Year:    Transportation Needs:     Lack of Transportation (Medical):      Lack of Transportation (Non-Medical):    Physical Activity:     Days of Exercise per Week:     Minutes of Exercise per Session:    Stress:     Feeling of Stress :    Social Connections:     Frequency of Communication with Friends and Family:     Frequency of Social Gatherings with Friends and Family:     Attends Adventism Services:     Active Member of Clubs or Organizations:     Attends Club or Organization Meetings:     Marital Status:    Intimate Partner Violence:     Fear of Current or Ex-Partner:     Emotionally Abused:     Physically Abused:     Sexually Abused:        SCREENINGS                        PHYSICAL EXAM    (up to 7 for level 4, 8 or more for level 5)     ED Triage Vitals [09/24/21 1032]   BP Temp Temp Source Pulse Resp SpO2 Height Weight   117/86 97.4 °F (36.3 °C) Temporal 86 16 100 % 5' 8\" (1.727 m) 180 lb (81.6 kg)       Physical Exam     PHYSICAL EXAM    GENERAL: ALERT, NO ACUTE DISTRESS, TALKING IN FULL AND COMPELTE SENTENCES  SKIN: WARM, DRY, NO RASH, INTACT  HEAD: NORMOCEPHALIC, ATRAUMATIC  EYES: Edema to the left infraorbital area, no tenderness, no open wounds or drainage, no streaking, PERRLA, EOMI, NORMAL CONJUNCTIVA, no pain or issues to move her eyeball  NECK: SUPPLE, TRACHEA MIDLINE, FROM  RESPIRATORY: NON LABORED RESPIRATIONS, SYMMETRICAL EXPANSION  EXTREMITIES: NO CYANOSIS, NO EDEMA, FROM x4, PULSES INTACT, CAPILLARY REFILL INTACT, SENSORY INTACT  NEURO: A&OX3  PSYCH: COOPERATIVE, APPROPRIATE MOOD AND AFFECT    DIAGNOSTIC RESULTS       No orders to display         ED BEDSIDE ULTRASOUND:   Performed by ED Physician - none    LABS:  Labs Reviewed - No data to display    All other labs were within normal range or not returned as of this dictation. EMERGENCY DEPARTMENT COURSE and DIFFERENTIAL DIAGNOSIS/MDM:   Vitals:    Vitals:    09/24/21 1032   BP: 117/86   Pulse: 86   Resp: 16   Temp: 97.4 °F (36.3 °C)   TempSrc: Temporal   SpO2: 100%   Weight: 180 lb (81.6 kg)   Height: 5' 8\" (1.727 m)         MDM    Likely a localized allergic reaction and she will be given Decadron and can use over-the-counter antihistamine. Explained to patient why I do not think this was a bacterial infection and she is to follow-up with the family doctor. Afebrile, not tachycardic, non toxic appearing, tolerating PO, ambulating at baseline and hemodynamically stable to be discharged. answered all questions. pt in agreement with tx. educated when to return to ER.  REASSESSMENT          CRITICAL CARE TIME       CONSULTS:  None    PROCEDURES:  Unless otherwise noted below, none     Procedures      FINAL IMPRESSION      1.  Allergy, initial encounter          DISPOSITION/PLAN   DISPOSITION Decision To Discharge 09/24/2021 10:58:47 AM      PATIENT REFERRED TO:  [facility] ED  [address]  [address] [address]  [phone]    If symptoms worsen, As needed      DISCHARGE MEDICATIONS:  New Prescriptions    No medications on file     Controlled Substances Monitoring:     No flowsheet data found.     (Please note that portions of this note were completed with a voice recognition program.  Efforts were made to edit the dictations but occasionally words are mis-transcribed.)    NIMA Matthews (electronically signed)  Attending Emergency Physician           NIMA Mtathews  09/24/21 1100

## 2025-01-02 ENCOUNTER — OFFICE VISIT (OUTPATIENT)
Dept: OBGYN CLINIC | Age: 35
End: 2025-01-02

## 2025-01-02 VITALS
WEIGHT: 204 LBS | DIASTOLIC BLOOD PRESSURE: 80 MMHG | BODY MASS INDEX: 31.02 KG/M2 | SYSTOLIC BLOOD PRESSURE: 120 MMHG | HEART RATE: 97 BPM

## 2025-01-02 DIAGNOSIS — N91.2 AMENORRHEA: ICD-10-CM

## 2025-01-02 DIAGNOSIS — Z11.51 SCREENING FOR HPV (HUMAN PAPILLOMAVIRUS): ICD-10-CM

## 2025-01-02 DIAGNOSIS — N91.2 AMENORRHEA: Primary | ICD-10-CM

## 2025-01-02 DIAGNOSIS — G44.209 TENSION HEADACHE: ICD-10-CM

## 2025-01-02 DIAGNOSIS — Z13.1 SCREENING FOR DIABETES MELLITUS: ICD-10-CM

## 2025-01-02 LAB
AMPHET UR QL SCN: NORMAL
BACTERIA URNS QL MICRO: NEGATIVE /HPF
BARBITURATES UR QL SCN: NORMAL
BENZODIAZ UR QL SCN: NORMAL
BILIRUB UR QL STRIP: NEGATIVE
CANNABINOIDS UR QL SCN: NORMAL
CLARITY UR: ABNORMAL
COCAINE UR QL SCN: NORMAL
COLOR UR: YELLOW
DRUG SCREEN COMMENT UR-IMP: NORMAL
EPI CELLS #/AREA URNS AUTO: ABNORMAL /HPF (ref 0–5)
FENTANYL SCREEN, URINE: NORMAL
GLUCOSE UR STRIP-MCNC: NEGATIVE MG/DL
HGB UR QL STRIP: ABNORMAL
HYALINE CASTS #/AREA URNS AUTO: ABNORMAL /HPF (ref 0–5)
KETONES UR STRIP-MCNC: NEGATIVE MG/DL
LEUKOCYTE ESTERASE UR QL STRIP: ABNORMAL
METHADONE UR QL SCN: NORMAL
NITRITE UR QL STRIP: NEGATIVE
OPIATES UR QL SCN: NORMAL
OXYCODONE UR QL SCN: NORMAL
PCP UR QL SCN: NORMAL
PH UR STRIP: 6 [PH] (ref 5–9)
PROPOXYPH UR QL SCN: NORMAL
PROT UR STRIP-MCNC: NEGATIVE MG/DL
RBC #/AREA URNS AUTO: ABNORMAL /HPF (ref 0–5)
SP GR UR STRIP: 1.02 (ref 1–1.03)
UROBILINOGEN UR STRIP-ACNC: 0.2 E.U./DL
WBC #/AREA URNS AUTO: >100 /HPF (ref 0–5)

## 2025-01-02 RX ORDER — BUTALBITAL, ACETAMINOPHEN AND CAFFEINE 50; 325; 40 MG/1; MG/1; MG/1
1 TABLET ORAL EVERY 4 HOURS PRN
Qty: 180 TABLET | Refills: 3 | Status: SHIPPED | OUTPATIENT
Start: 2025-01-02

## 2025-01-02 RX ORDER — VITAMIN A, VITAMIN C, VITAMIN D-3, VITAMIN E, VITAMIN B-1, VITAMIN B-2, NIACIN, VITAMIN B-6, CALCIUM, IRON, ZINC, COPPER 4000; 120; 400; 22; 1.84; 3; 20; 10; 1; 12; 200; 27; 25; 2 [IU]/1; MG/1; [IU]/1; MG/1; MG/1; MG/1; MG/1; MG/1; MG/1; UG/1; MG/1; MG/1; MG/1; MG/1
1 TABLET ORAL DAILY
Qty: 90 TABLET | Refills: 3 | Status: SHIPPED | OUTPATIENT
Start: 2025-01-02

## 2025-01-02 RX ORDER — ONDANSETRON 8 MG/1
8 TABLET, FILM COATED ORAL EVERY 8 HOURS PRN
Qty: 90 TABLET | Refills: 1 | Status: SHIPPED | OUTPATIENT
Start: 2025-01-02

## 2025-01-02 SDOH — ECONOMIC STABILITY: INCOME INSECURITY: HOW HARD IS IT FOR YOU TO PAY FOR THE VERY BASICS LIKE FOOD, HOUSING, MEDICAL CARE, AND HEATING?: NOT HARD AT ALL

## 2025-01-02 SDOH — ECONOMIC STABILITY: FOOD INSECURITY: WITHIN THE PAST 12 MONTHS, THE FOOD YOU BOUGHT JUST DIDN'T LAST AND YOU DIDN'T HAVE MONEY TO GET MORE.: NEVER TRUE

## 2025-01-02 SDOH — ECONOMIC STABILITY: FOOD INSECURITY: WITHIN THE PAST 12 MONTHS, YOU WORRIED THAT YOUR FOOD WOULD RUN OUT BEFORE YOU GOT MONEY TO BUY MORE.: NEVER TRUE

## 2025-01-02 ASSESSMENT — ENCOUNTER SYMPTOMS
BACK PAIN: 0
COUGH: 0
ABDOMINAL DISTENTION: 0
TROUBLE SWALLOWING: 0
WHEEZING: 0
SORE THROAT: 0
ABDOMINAL PAIN: 0
BLOOD IN STOOL: 0
SHORTNESS OF BREATH: 0
CHEST TIGHTNESS: 0
VOMITING: 0
CONSTIPATION: 0
VOICE CHANGE: 0
NAUSEA: 0
COLOR CHANGE: 0

## 2025-01-02 ASSESSMENT — PATIENT HEALTH QUESTIONNAIRE - PHQ9
SUM OF ALL RESPONSES TO PHQ QUESTIONS 1-9: 0
SUM OF ALL RESPONSES TO PHQ9 QUESTIONS 1 & 2: 0
SUM OF ALL RESPONSES TO PHQ QUESTIONS 1-9: 0
2. FEELING DOWN, DEPRESSED OR HOPELESS: NOT AT ALL
1. LITTLE INTEREST OR PLEASURE IN DOING THINGS: NOT AT ALL

## 2025-01-02 NOTE — PROGRESS NOTES
LMP 11/3/2024  
myalgias.   Skin:  Negative for color change and rash.   Allergic/Immunologic: Negative for environmental allergies, food allergies and immunocompromised state.   Neurological:  Negative for dizziness, seizures, syncope, speech difficulty, weakness, numbness and headaches.   Hematological:  Negative for adenopathy. Does not bruise/bleed easily.   Psychiatric/Behavioral:  Negative for agitation, behavioral problems, confusion, decreased concentration, dysphoric mood and suicidal ideas. The patient is not nervous/anxious and is not hyperactive.        Physical Exam  Vitals and nursing note reviewed. Exam conducted with a chaperone present.   Genitourinary:     General: Normal vulva.               Vitals:    25 1010   BP: 120/80   Site: Left Upper Arm   Pulse: 97   Weight: 92.5 kg (204 lb)         ASSESSMENT/PLAN:     Diagnosis Orders   1. Amenorrhea  CBC with Auto Differential    Culture, Urine    HCG, Quantitative, Pregnancy    Hemoglobin A1C    Hepatitis B Surface Antigen    Hepatitis C Antibody    HIV Screen    RPR    Rubella antibody, IgG    TSH    Sickle Cell Screen    Type and Screen    Urinalysis    Urine Drug Screen    Varicella Zoster Antibody, IgG    Wet prep, genital    PAP SMEAR    C.trachomatis N.gonorrhoeae DNA, Thin Prep    Prenatal Vit-Fe Fumarate-FA (PRENATAL VITAMIN PLUS LOW IRON) 27-1 MG TABS    US OB LESS THAN 14 WEEKS SINGLE OR FIRST GESTATION    Ferritin    US OB Transvaginal - Clinic Performed    US OB Transvaginal - Clinic Performed      2. Screening for diabetes mellitus  Hemoglobin A1C      3. Screening for HPV (human papillomavirus)  PAP SMEAR      IUP at 8 weeks  Advanced maternal age in the first trimester  History of  section  History of  labor with delivery of twins.  History of preeclampsia    PLAN: For have her get an ultrasound at the hospital in 3 weeks.  Follow-up here in 4 weeks.  81 mg aspirin twice daily starting at 12 weeks  Discussed options of trial of

## 2025-01-03 ENCOUNTER — TELEPHONE (OUTPATIENT)
Dept: OBGYN CLINIC | Age: 35
End: 2025-01-03

## 2025-01-03 LAB
CLUE CELLS VAG QL WET PREP: ABNORMAL
T VAGINALIS VAG QL WET PREP: ABNORMAL
TRICHOMONAS VAGINALIS SCREEN: NEGATIVE
YEAST VAG QL WET PREP: ABNORMAL

## 2025-01-03 RX ORDER — METRONIDAZOLE 7.5 MG/G
1 GEL VAGINAL DAILY
Qty: 70 G | Refills: 0 | Status: SHIPPED | OUTPATIENT
Start: 2025-01-03 | End: 2025-01-08

## 2025-01-04 LAB
BACTERIA UR CULT: ABNORMAL
BACTERIA UR CULT: ABNORMAL
ORGANISM: ABNORMAL

## 2025-01-07 ENCOUNTER — TELEPHONE (OUTPATIENT)
Dept: OBGYN CLINIC | Age: 35
End: 2025-01-07

## 2025-01-07 RX ORDER — NITROFURANTOIN 25; 75 MG/1; MG/1
100 CAPSULE ORAL 2 TIMES DAILY
Qty: 20 CAPSULE | Refills: 0 | Status: SHIPPED | OUTPATIENT
Start: 2025-01-07 | End: 2025-01-17

## 2025-01-07 NOTE — TELEPHONE ENCOUNTER
----- Message from Dr. Lacho Lawrence DO sent at 1/6/2025 12:21 PM EST -----  Macrobid for 10 days

## 2025-01-09 LAB
C TRACH DNA CVX QL NAA+PROBE: NEGATIVE
N GONORRHOEA DNA SPEC QL NAA+PROBE: NEGATIVE

## 2025-01-20 ENCOUNTER — OFFICE VISIT (OUTPATIENT)
Dept: URGENT CARE | Age: 35
End: 2025-01-20
Payer: COMMERCIAL

## 2025-01-20 VITALS
BODY MASS INDEX: 27.28 KG/M2 | HEART RATE: 100 BPM | HEIGHT: 68 IN | TEMPERATURE: 98.1 F | DIASTOLIC BLOOD PRESSURE: 84 MMHG | RESPIRATION RATE: 20 BRPM | WEIGHT: 180 LBS | OXYGEN SATURATION: 99 % | SYSTOLIC BLOOD PRESSURE: 124 MMHG

## 2025-01-20 DIAGNOSIS — U07.1 COVID-19: ICD-10-CM

## 2025-01-20 LAB
POC RAPID INFLUENZA A: NEGATIVE
POC RAPID INFLUENZA B: NEGATIVE
POC RAPID STREP: NEGATIVE
POC SARS-COV-2 AG BINAX: ABNORMAL

## 2025-01-20 PROCEDURE — 3008F BODY MASS INDEX DOCD: CPT

## 2025-01-20 PROCEDURE — 87880 STREP A ASSAY W/OPTIC: CPT

## 2025-01-20 PROCEDURE — 87811 SARS-COV-2 COVID19 W/OPTIC: CPT

## 2025-01-20 PROCEDURE — 1036F TOBACCO NON-USER: CPT

## 2025-01-20 PROCEDURE — 87804 INFLUENZA ASSAY W/OPTIC: CPT

## 2025-01-20 PROCEDURE — 99213 OFFICE O/P EST LOW 20 MIN: CPT

## 2025-01-20 RX ORDER — FERROUS SULFATE 325(65) MG
325 TABLET ORAL
COMMUNITY

## 2025-01-20 ASSESSMENT — ENCOUNTER SYMPTOMS: SORE THROAT: 1

## 2025-01-20 NOTE — LETTER
January 20, 2025     Patient: Myah Dudley   YOB: 1990   Date of Visit: 1/20/2025       To Whom It May Concern:    It is my medical opinion that Myah Dudley may return to work on 01/24/2025 .    If you have any questions or concerns, please don't hesitate to call.         Sincerely,        Teja Ballesteros, BELTRAN-CNP    CC: No Recipients

## 2025-01-20 NOTE — PROGRESS NOTES
"Subjective   Patient ID: Myah Dudley is a 35 y.o. female. They present today with a chief complaint of Sore Throat (Sore throat, nasal congestion, chills, headache, and nausea x Saturday ).    History of Present Illness  Subjective  History was provided by the patient.  Myah Dudley is a 35 y.o. female who presents for evaluation of a sore throat. Associated symptoms include chills, myalgias, nasal blockage, pain while swallowing, post nasal drip, sinus and nasal congestion, and sore throat. Onset of symptoms was 2 days ago, gradually worsening since that time. She is drinking plenty of fluids. She has not had recent close exposure to someone with proven streptococcal pharyngitis.  Patient is 11 weeks pregnant.    Review of Systems   Constitutional:  Endorses chills, malaise, fatigue. Denies fever.  ENT: See HPI  Respiratory:  Denies cough, shortness of breath, wheezing, sputum production  Cardiovascular:  Denies chest pain, palpitations, syncope, lightheadedness, dizziness.    Gastrointestinal:  Denies abdominal pain, nausea, vomiting, diarrhea.    Integumentary:  Gage rash.    All other systems are negative    Objective  /84 (BP Location: Right arm, Patient Position: Sitting, BP Cuff Size: Adult)   Pulse 100   Temp 36.7 °C (98.1 °F) (Temporal)   Resp 20   Ht 1.727 m (5' 8\")   Wt 81.6 kg (180 lb)   SpO2 99%   BMI 27.37 kg/m²   [unfilled]         History provided by:  Patient   used: No    Sore Throat         Past Medical History  Allergies as of 01/20/2025    (No Known Allergies)       (Not in a hospital admission)       History reviewed. No pertinent past medical history.    History reviewed. No pertinent surgical history.     reports that she has never smoked. She has never used smokeless tobacco. She reports that she does not use drugs.    Review of Systems  Review of Systems   HENT:  Positive for sore throat.                                   Objective    Vitals:    01/20/25 " "1139   BP: 124/84   BP Location: Right arm   Patient Position: Sitting   BP Cuff Size: Adult   Pulse: 100   Resp: 20   Temp: 36.7 °C (98.1 °F)   TempSrc: Temporal   SpO2: 99%   Weight: 81.6 kg (180 lb)   Height: 1.727 m (5' 8\")     No LMP recorded. Patient is pregnant.    Physical Exam  Vitals and nursing note reviewed.   Constitutional:       General: She is not in acute distress.     Appearance: Normal appearance. She is normal weight. She is ill-appearing. She is not toxic-appearing or diaphoretic.   HENT:      Nose: Congestion and rhinorrhea present.      Right Turbinates: Enlarged and swollen.      Left Turbinates: Enlarged and swollen.      Mouth/Throat:      Mouth: Mucous membranes are moist.      Pharynx: Oropharynx is clear. Posterior oropharyngeal erythema and postnasal drip present. No pharyngeal swelling, oropharyngeal exudate or uvula swelling.      Tonsils: No tonsillar exudate or tonsillar abscesses.   Eyes:      General:         Right eye: No discharge.         Left eye: No discharge.      Conjunctiva/sclera: Conjunctivae normal.   Cardiovascular:      Rate and Rhythm: Normal rate and regular rhythm.      Pulses: Normal pulses.      Heart sounds: Normal heart sounds.   Pulmonary:      Effort: Pulmonary effort is normal. No respiratory distress.      Breath sounds: Normal breath sounds. No stridor. No wheezing, rhonchi or rales.   Musculoskeletal:      Cervical back: Normal range of motion and neck supple. No rigidity or tenderness.   Lymphadenopathy:      Cervical: No cervical adenopathy.   Skin:     General: Skin is warm and dry.      Coloration: Skin is not jaundiced or pale.      Findings: No bruising, erythema or rash.   Neurological:      General: No focal deficit present.      Mental Status: She is alert and oriented to person, place, and time.         Procedures    Point of Care Test & Imaging Results from this visit  Results for orders placed or performed in visit on 01/20/25   POCT Covid-19 " Rapid Antigen   Result Value Ref Range    POC TARI-COV-2 AG Positive test for SARS-CoV-2 (antigen detected) (A) Presumptive negative test for SARS-CoV-2 (no antigen detected)   POCT Influenza A/B manually resulted   Result Value Ref Range    POC Rapid Influenza A Negative Negative    POC Rapid Influenza B Negative Negative   POCT rapid strep A manually resulted   Result Value Ref Range    POC Rapid Strep Negative Negative      No results found.    Diagnostic study results (if any) were reviewed by JENNIFER Gee.    Assessment/Plan   Allergies, medications, history, and pertinent labs/EKGs/Imaging reviewed by JENNIFER Gee.     Medical Decision Making    Rapid Covid-19 positive, consistent with?hx?and symptoms.? No indications for antibiotics at this time. No evidence severe shortness of breath, pneumonia, myocarditis or other secondary bacterial infection. Encouraged patient to continue supportive?care measures and symptom management.? Advised to follow-up with primary care provider if symptoms persist, or return with any new or worsening symptoms. Patient agreeable with plan and verbalized understanding.      Testing: COVID - positive, Influenza, strep negative    Treatment: Supportive - acetaminophen, nasal saline, rest, clear liquids    Differential: 1) COVID, 2) Influenza , 3) Viral syndrome with pharyngitis    Impression: COVID    We discussed with the patient our clinical thoughts at this time given the above findings and clinical assessment and we had a shared decision-making conversation in a patient-centered decision-making model on how to proceed forward. The patient was instructed on the importance of a close follow-up with PCP and other care providers. The patient was also advised that an Urgent care diagnosis is often a preliminary impression and that definitive care is often not able to be given completley in the Urgent care setting.     At time of discharge patient was clinically  well-appearing and HDS for outpatient management. The patient was educated regarding diagnosis, supportive care, OTC and Rx medications. The patient was given the opportunity to ask questions prior to discharge.  They verbalized understanding of my discussion of the plans for treatment, expected course, indications to return to  or seek further evaluation in ED, and the need for timely follow up as directed.   They were provided with a work/school excuse if requested.     Orders and Diagnoses  Diagnoses and all orders for this visit:  COVID-19  -     POCT Covid-19 Rapid Antigen  -     POCT Influenza A/B manually resulted  -     POCT rapid strep A manually resulted      Medical Admin Record      Patient disposition: Home    Electronically signed by JENNIFER Gee  12:05 PM

## 2025-01-20 NOTE — PATIENT INSTRUCTIONS
You tested positive for COVID. This is highly contagious, cover your mouth when you cough or sneeze. Wash hands frequently. Do not share foods or liquids. Wear a mask if you go in public     COVID causes symptoms such as fever, cough, body aches. COVID is a viral illness, in which antibiotics are not an effective therapy. Best way to treat COVID is to: stay home, rest, and drink plenty of fluids. Recommend acetaminophen (Tylenol) to relieve fever and aches. Most people with COVID get better on their own within 1 to 2 weeks. For any worsening symptoms (ie difficulty breathing, severe vomiting, confusion/really really bad headache), please contact your primary care provider or go to the emergency department for thorough evaluation.

## 2025-02-27 ENCOUNTER — HOSPITAL ENCOUNTER (OUTPATIENT)
Dept: ULTRASOUND IMAGING | Age: 35
Discharge: HOME | End: 2025-03-01
Attending: OBSTETRICS & GYNECOLOGY
Payer: COMMERCIAL

## 2025-02-27 DIAGNOSIS — N91.2 AMENORRHEA: ICD-10-CM

## 2025-02-27 PROCEDURE — 76805 OB US >/= 14 WKS SNGL FETUS: CPT

## 2025-02-27 PROCEDURE — 76817 TRANSVAGINAL US OBSTETRIC: CPT

## 2025-03-19 DIAGNOSIS — N91.2 AMENORRHEA: ICD-10-CM

## 2025-03-19 DIAGNOSIS — Z13.1 SCREENING FOR DIABETES MELLITUS: ICD-10-CM

## 2025-03-19 LAB
BASOPHILS # BLD: 0 K/UL (ref 0–0.2)
BASOPHILS NFR BLD: 0.2 %
EOSINOPHIL # BLD: 0.1 K/UL (ref 0–0.7)
EOSINOPHIL NFR BLD: 1.6 %
ERYTHROCYTE [DISTWIDTH] IN BLOOD BY AUTOMATED COUNT: 12.4 % (ref 11.5–14.5)
GONADOTROPIN, CHORIONIC (HCG) QUANT: NORMAL MIU/ML
HBV SURFACE AG SERPL QL IA: NORMAL
HCT VFR BLD AUTO: 31 % (ref 37–47)
HGB BLD-MCNC: 10.4 G/DL (ref 12–16)
LYMPHOCYTES # BLD: 2 K/UL (ref 1–4.8)
LYMPHOCYTES NFR BLD: 31.5 %
MCH RBC QN AUTO: 32.8 PG (ref 27–31.3)
MCHC RBC AUTO-ENTMCNC: 33.5 % (ref 33–37)
MCV RBC AUTO: 97.8 FL (ref 79.4–94.8)
MONOCYTES # BLD: 0.5 K/UL (ref 0.2–0.8)
MONOCYTES NFR BLD: 8.6 %
NEUTROPHILS # BLD: 3.6 K/UL (ref 1.4–6.5)
NEUTS SEG NFR BLD: 57.1 %
PLATELET # BLD AUTO: 292 K/UL (ref 130–400)
RBC # BLD AUTO: 3.17 M/UL (ref 4.2–5.4)
REAGIN+T PALLIDUM IGG+IGM SERPL-IMP: NORMAL
RUBELLA ANTIBODY IGG: 203.8 IU/ML
TSH SERPL-MCNC: 0.41 UIU/ML (ref 0.44–3.86)
WBC # BLD AUTO: 6.3 K/UL (ref 4.8–10.8)

## 2025-03-20 LAB
ABO/RH: NORMAL
ANTIBODY SCREEN: NORMAL
ESTIMATED AVERAGE GLUCOSE: 68 MG/DL
FERRITIN: 64 NG/ML (ref 15–150)
HBA1C MFR BLD: 4 % (ref 4–6)
HEPATITIS C ANTIBODY: NONREACTIVE
HIV AG/AB: NONREACTIVE
SICKLE CELL SCREEN: NEGATIVE

## 2025-03-21 LAB — VZV IGG SER IA-ACNC: 3.2 S/CO

## 2025-03-25 ENCOUNTER — INITIAL PRENATAL (OUTPATIENT)
Dept: OBGYN CLINIC | Age: 35
End: 2025-03-25
Payer: COMMERCIAL

## 2025-03-25 VITALS
SYSTOLIC BLOOD PRESSURE: 134 MMHG | BODY MASS INDEX: 32.23 KG/M2 | HEART RATE: 95 BPM | DIASTOLIC BLOOD PRESSURE: 72 MMHG | WEIGHT: 212 LBS

## 2025-03-25 DIAGNOSIS — Z3A.21 21 WEEKS GESTATION OF PREGNANCY: ICD-10-CM

## 2025-03-25 DIAGNOSIS — O09.522 AMA (ADVANCED MATERNAL AGE) MULTIGRAVIDA 35+, SECOND TRIMESTER: Primary | ICD-10-CM

## 2025-03-25 PROCEDURE — 99213 OFFICE O/P EST LOW 20 MIN: CPT | Performed by: OBSTETRICS & GYNECOLOGY

## 2025-03-25 PROCEDURE — G8419 CALC BMI OUT NRM PARAM NOF/U: HCPCS | Performed by: OBSTETRICS & GYNECOLOGY

## 2025-03-25 PROCEDURE — G8427 DOCREV CUR MEDS BY ELIG CLIN: HCPCS | Performed by: OBSTETRICS & GYNECOLOGY

## 2025-03-25 PROCEDURE — 4004F PT TOBACCO SCREEN RCVD TLK: CPT | Performed by: OBSTETRICS & GYNECOLOGY

## 2025-03-25 NOTE — PROGRESS NOTES
She had passed out the other day. She was given fiorcet for headaches but it is not helping much .

## 2025-03-25 NOTE — PROGRESS NOTES
Patient's last menstrual period was 11/03/2024.  Please reference prenatal and OB flow chart for further information  PT here today for routine prenatal care  Pt endorses fetal movement and denies loss of fluid, contractions or vaginal bleeding  Patient is complaining of symptoms referable to hypoglycemia and hypotension.  She had an ultrasound at 17+ weeks with multiple cell visualized areas.  She also has headaches that are refractory to both Tylenol and Fioricet.  ROS:  Pt denies headache, vision changes, right upper quadrant pain, dysuria, or nausea/vomiting,     PE:  /72   Pulse 95   Wt 96.2 kg (212 lb)   LMP 11/03/2024   BMI 32.23 kg/m²   Gen - Alert and oriented x 3  HEENT- NC/AT, CVS - RRR, Lungs - CTAB  Abd - FH 22        ASSESSMENT AND PLAN:   IUP at 21 weeks and 4 days  Tension headaches  Hypoglycemia  Cell visualized areas on  Orthostatic hypotension  Recommend that she use massage heat stretch chiropractic care for her headaches  Increase fluid calorie consumption.  Patient to follow-up in 4 weeks  Repeat ultrasound for cell visualized anatomy

## 2025-04-24 ENCOUNTER — HOSPITAL ENCOUNTER (OUTPATIENT)
Dept: ULTRASOUND IMAGING | Age: 35
Discharge: HOME | End: 2025-04-26
Attending: OBSTETRICS & GYNECOLOGY
Payer: COMMERCIAL

## 2025-04-24 DIAGNOSIS — O09.522 AMA (ADVANCED MATERNAL AGE) MULTIGRAVIDA 35+, SECOND TRIMESTER: ICD-10-CM

## 2025-04-24 PROCEDURE — 76815 OB US LIMITED FETUS(S): CPT

## 2025-04-24 PROCEDURE — 76817 TRANSVAGINAL US OBSTETRIC: CPT

## 2025-05-01 ENCOUNTER — ROUTINE PRENATAL (OUTPATIENT)
Dept: OBGYN CLINIC | Age: 35
End: 2025-05-01
Payer: COMMERCIAL

## 2025-05-01 VITALS
BODY MASS INDEX: 32.99 KG/M2 | DIASTOLIC BLOOD PRESSURE: 64 MMHG | SYSTOLIC BLOOD PRESSURE: 118 MMHG | WEIGHT: 217 LBS | HEART RATE: 114 BPM

## 2025-05-01 DIAGNOSIS — O09.522 AMA (ADVANCED MATERNAL AGE) MULTIGRAVIDA 35+, SECOND TRIMESTER: Primary | ICD-10-CM

## 2025-05-01 PROCEDURE — G8427 DOCREV CUR MEDS BY ELIG CLIN: HCPCS | Performed by: OBSTETRICS & GYNECOLOGY

## 2025-05-01 PROCEDURE — 99213 OFFICE O/P EST LOW 20 MIN: CPT | Performed by: OBSTETRICS & GYNECOLOGY

## 2025-05-01 PROCEDURE — G8419 CALC BMI OUT NRM PARAM NOF/U: HCPCS | Performed by: OBSTETRICS & GYNECOLOGY

## 2025-05-01 PROCEDURE — 4004F PT TOBACCO SCREEN RCVD TLK: CPT | Performed by: OBSTETRICS & GYNECOLOGY

## 2025-05-01 SDOH — ECONOMIC STABILITY: FOOD INSECURITY: WITHIN THE PAST 12 MONTHS, THE FOOD YOU BOUGHT JUST DIDN'T LAST AND YOU DIDN'T HAVE MONEY TO GET MORE.: NEVER TRUE

## 2025-05-01 SDOH — ECONOMIC STABILITY: FOOD INSECURITY: WITHIN THE PAST 12 MONTHS, YOU WORRIED THAT YOUR FOOD WOULD RUN OUT BEFORE YOU GOT MONEY TO BUY MORE.: NEVER TRUE

## 2025-05-01 NOTE — PROGRESS NOTES
Patient's last menstrual period was 2024.  Please reference prenatal and OB flow chart for further information  PT here today for routine prenatal care  Pt endorses fetal movement and denies loss of fluid, contractions or vaginal bleeding  Patient states that she is having some issues with emotional lability.  She not really feeling depressed but she just feels overwhelmed and stressed no desire to hurt herself or others  ROS:  Pt denies headache, vision changes, right upper quadrant pain, dysuria, or nausea/vomiting,     PE:  /64   Pulse (!) 114   Wt 98.4 kg (217 lb)   LMP 2024   BMI 32.99 kg/m²   Gen - Alert and oriented x 3  HEENT- NC/AT, CVS - RRR, Lungs - CTAB  Abd - FH 26        ASSESSMENT AND PLAN:   IUP at 26 weeks and 6 days  Advanced maternal age in the second trimester  Previous  section x 2  Lethargy and stress.  Risk factors for ovarian cancer.  Patient to think about having the risk reducing bilateral salpingectomy at the time of her .  We discussed counseling patient does not want to pursue that route.  She will follow-up in 2 weeks patient advised to get her GTT done as soon as possible and her hemoglobin to make sure that she is not severely anemic which could be contributing to her symptoms

## 2025-07-14 ENCOUNTER — TELEPHONE (OUTPATIENT)
Dept: OBGYN CLINIC | Age: 35
End: 2025-07-14

## 2025-07-14 NOTE — TELEPHONE ENCOUNTER
Called pt to follow up on rescheduling missed appointment, left message with person who answered the phone( name not disclosed) to have her reach out to us to follow up with care.    Needs scheduled for prenatal visit asap.

## 2025-07-17 ENCOUNTER — ROUTINE PRENATAL (OUTPATIENT)
Dept: OBGYN CLINIC | Age: 35
End: 2025-07-17

## 2025-07-17 VITALS
BODY MASS INDEX: 35.43 KG/M2 | DIASTOLIC BLOOD PRESSURE: 80 MMHG | SYSTOLIC BLOOD PRESSURE: 130 MMHG | WEIGHT: 233 LBS | HEART RATE: 100 BPM

## 2025-07-17 DIAGNOSIS — Z3A.37 37 WEEKS GESTATION OF PREGNANCY: ICD-10-CM

## 2025-07-17 DIAGNOSIS — O09.523 AMA (ADVANCED MATERNAL AGE) MULTIGRAVIDA 35+, THIRD TRIMESTER: Primary | ICD-10-CM

## 2025-07-17 NOTE — PROGRESS NOTES
Patient's last menstrual period was 2024.  Please reference prenatal and OB flow chart for further information  PT here today for routine prenatal care  Pt endorses fetal movement and denies loss of fluid, contractions or vaginal bleeding  She has been noncompliant.  Last seen here at 26 weeks.  Previous  section for repeat she is now 37 weeks and 6 days.  She is feeling good movement.  ROS:  Pt denies headache, vision changes, right upper quadrant pain, dysuria, or nausea/vomiting,     PE:  /80   Pulse 100   Wt 105.7 kg (233 lb)   LMP 2024   BMI 35.43 kg/m²   Gen - Alert and oriented x 3  HEENT- NC/AT, CVS - RRR, Lungs - CTAB  Abd - FH 39    Nonstress test is reactive indication advanced maternal age in the third trimester  Duration 20 minutes baseline in the 140s moderate variability.  Positive accelerations negative decelerations  Group B strep cultures      ASSESSMENT AND PLAN:   IUP at 37 weeks and 6 days  Previous  section for repeat  Advanced maternal age in the third trimester  Patient noncompliance  Patient had a reactive NST.  She will get her ultrasound before next visit and we can have her follow-up here postoperatively.  She is scheduled for a repeat  section on 725

## 2025-07-19 ENCOUNTER — ANESTHESIA (OUTPATIENT)
Dept: LABOR AND DELIVERY | Age: 35
End: 2025-07-19
Payer: COMMERCIAL

## 2025-07-19 ENCOUNTER — ANESTHESIA EVENT (OUTPATIENT)
Dept: LABOR AND DELIVERY | Age: 35
End: 2025-07-19
Payer: COMMERCIAL

## 2025-07-19 ENCOUNTER — HOSPITAL ENCOUNTER (INPATIENT)
Age: 35
LOS: 2 days | Discharge: HOME | DRG: 540 | End: 2025-07-21
Attending: OBSTETRICS & GYNECOLOGY | Admitting: OBSTETRICS & GYNECOLOGY
Payer: COMMERCIAL

## 2025-07-19 PROBLEM — O36.8390 NON-REASSURING FETAL HEART RATE WITH LATE DECELERATION: Status: ACTIVE | Noted: 2025-07-19

## 2025-07-19 LAB
ABO/RH: NORMAL
ALBUMIN SERPL-MCNC: 3.5 G/DL (ref 3.5–4.6)
ALP SERPL-CCNC: 162 U/L (ref 40–130)
ALT SERPL-CCNC: 16 U/L (ref 0–33)
AMPHET UR QL SCN: ABNORMAL
ANION GAP SERPL CALCULATED.3IONS-SCNC: 14 MEQ/L (ref 9–15)
ANTIBODY SCREEN: NORMAL
AST SERPL-CCNC: 21 U/L (ref 0–35)
BACTERIA URNS QL MICRO: NEGATIVE /HPF
BARBITURATES UR QL SCN: POSITIVE
BENZODIAZ UR QL SCN: ABNORMAL
BILIRUB SERPL-MCNC: 0.9 MG/DL (ref 0.2–0.7)
BILIRUB UR QL STRIP: NEGATIVE
BUN SERPL-MCNC: 5 MG/DL (ref 6–20)
CALCIUM SERPL-MCNC: 8.8 MG/DL (ref 8.5–9.9)
CANNABINOIDS UR QL SCN: ABNORMAL
CHLORIDE SERPL-SCNC: 101 MEQ/L (ref 95–107)
CLARITY UR: CLEAR
CO2 SERPL-SCNC: 18 MEQ/L (ref 20–31)
COCAINE UR QL SCN: ABNORMAL
COLOR UR: YELLOW
CREAT SERPL-MCNC: 0.49 MG/DL (ref 0.5–0.9)
DRUG SCREEN COMMENT UR-IMP: ABNORMAL
EPI CELLS #/AREA URNS AUTO: ABNORMAL /HPF (ref 0–5)
ERYTHROCYTE [DISTWIDTH] IN BLOOD BY AUTOMATED COUNT: 12.6 % (ref 11.5–14.5)
FENTANYL SCREEN, URINE: ABNORMAL
GLOBULIN SER CALC-MCNC: 3.5 G/DL (ref 2.3–3.5)
GLUCOSE SERPL-MCNC: 84 MG/DL (ref 70–99)
GLUCOSE UR STRIP-MCNC: NEGATIVE MG/DL
HBV SURFACE AG SERPL QL IA: NORMAL
HCT VFR BLD AUTO: 32 % (ref 37–47)
HGB BLD-MCNC: 10.6 G/DL (ref 12–16)
HGB UR QL STRIP: ABNORMAL
HYALINE CASTS #/AREA URNS AUTO: ABNORMAL /HPF (ref 0–5)
KETONES UR STRIP-MCNC: 15 MG/DL
LEUKOCYTE ESTERASE UR QL STRIP: ABNORMAL
MCH RBC QN AUTO: 33.2 PG (ref 27–31.3)
MCHC RBC AUTO-ENTMCNC: 33.1 % (ref 33–37)
MCV RBC AUTO: 100.3 FL (ref 79.4–94.8)
METHADONE UR QL SCN: ABNORMAL
NITRITE UR QL STRIP: NEGATIVE
OPIATES UR QL SCN: ABNORMAL
OXYCODONE UR QL SCN: ABNORMAL
PCP UR QL SCN: ABNORMAL
PH UR STRIP: 6 [PH] (ref 5–9)
PLATELET # BLD AUTO: 226 K/UL (ref 130–400)
POTASSIUM SERPL-SCNC: 3.7 MEQ/L (ref 3.4–4.9)
PROPOXYPH UR QL SCN: ABNORMAL
PROT SERPL-MCNC: 7 G/DL (ref 6.3–8)
PROT UR STRIP-MCNC: 30 MG/DL
RBC # BLD AUTO: 3.19 M/UL (ref 4.2–5.4)
RBC #/AREA URNS AUTO: ABNORMAL /HPF (ref 0–5)
REAGIN+T PALLIDUM IGG+IGM SERPL-IMP: NORMAL
RENAL EPI CELLS #/AREA URNS HPF: ABNORMAL /HPF
SODIUM SERPL-SCNC: 133 MEQ/L (ref 135–144)
SP GR UR STRIP: 1.02 (ref 1–1.03)
URINE REFLEX TO CULTURE: ABNORMAL
UROBILINOGEN UR STRIP-ACNC: 1 E.U./DL
WBC # BLD AUTO: 9.9 K/UL (ref 4.8–10.8)
WBC #/AREA URNS AUTO: ABNORMAL /HPF (ref 0–5)

## 2025-07-19 PROCEDURE — 86901 BLOOD TYPING SEROLOGIC RH(D): CPT

## 2025-07-19 PROCEDURE — 87389 HIV-1 AG W/HIV-1&-2 AB AG IA: CPT

## 2025-07-19 PROCEDURE — 2709999900 HC NON-CHARGEABLE SUPPLY: Performed by: OBSTETRICS & GYNECOLOGY

## 2025-07-19 PROCEDURE — 2580000003 HC RX 258: Performed by: OBSTETRICS & GYNECOLOGY

## 2025-07-19 PROCEDURE — 81001 URINALYSIS AUTO W/SCOPE: CPT

## 2025-07-19 PROCEDURE — 7100000001 HC PACU RECOVERY - ADDTL 15 MIN: Performed by: OBSTETRICS & GYNECOLOGY

## 2025-07-19 PROCEDURE — 86780 TREPONEMA PALLIDUM: CPT

## 2025-07-19 PROCEDURE — 85027 COMPLETE CBC AUTOMATED: CPT

## 2025-07-19 PROCEDURE — 80307 DRUG TEST PRSMV CHEM ANLYZR: CPT

## 2025-07-19 PROCEDURE — 3609079900 HC CESAREAN SECTION: Performed by: OBSTETRICS & GYNECOLOGY

## 2025-07-19 PROCEDURE — 80053 COMPREHEN METABOLIC PANEL: CPT

## 2025-07-19 PROCEDURE — 86900 BLOOD TYPING SEROLOGIC ABO: CPT

## 2025-07-19 PROCEDURE — 2500000003 HC RX 250 WO HCPCS: Performed by: OBSTETRICS & GYNECOLOGY

## 2025-07-19 PROCEDURE — 3700000001 HC ADD 15 MINUTES (ANESTHESIA): Performed by: OBSTETRICS & GYNECOLOGY

## 2025-07-19 PROCEDURE — 7100000000 HC PACU RECOVERY - FIRST 15 MIN: Performed by: OBSTETRICS & GYNECOLOGY

## 2025-07-19 PROCEDURE — 6360000002 HC RX W HCPCS: Performed by: NURSE ANESTHETIST, CERTIFIED REGISTERED

## 2025-07-19 PROCEDURE — 6360000002 HC RX W HCPCS: Performed by: OBSTETRICS & GYNECOLOGY

## 2025-07-19 PROCEDURE — 1220000000 HC SEMI PRIVATE OB R&B

## 2025-07-19 PROCEDURE — 2500000003 HC RX 250 WO HCPCS: Performed by: NURSE ANESTHETIST, CERTIFIED REGISTERED

## 2025-07-19 PROCEDURE — 88307 TISSUE EXAM BY PATHOLOGIST: CPT

## 2025-07-19 PROCEDURE — 87340 HEPATITIS B SURFACE AG IA: CPT

## 2025-07-19 PROCEDURE — 6370000000 HC RX 637 (ALT 250 FOR IP): Performed by: OBSTETRICS & GYNECOLOGY

## 2025-07-19 PROCEDURE — 3700000000 HC ANESTHESIA ATTENDED CARE: Performed by: OBSTETRICS & GYNECOLOGY

## 2025-07-19 PROCEDURE — 86850 RBC ANTIBODY SCREEN: CPT

## 2025-07-19 RX ORDER — ACETAMINOPHEN 325 MG/1
975 TABLET ORAL ONCE
Status: DISCONTINUED | OUTPATIENT
Start: 2025-07-19 | End: 2025-07-19

## 2025-07-19 RX ORDER — ONDANSETRON 4 MG/1
4 TABLET, ORALLY DISINTEGRATING ORAL EVERY 8 HOURS PRN
Status: DISCONTINUED | OUTPATIENT
Start: 2025-07-19 | End: 2025-07-21 | Stop reason: HOSPADM

## 2025-07-19 RX ORDER — SODIUM CHLORIDE 0.9 % (FLUSH) 0.9 %
10 SYRINGE (ML) INJECTION PRN
Status: DISCONTINUED | OUTPATIENT
Start: 2025-07-19 | End: 2025-07-19

## 2025-07-19 RX ORDER — ONDANSETRON 2 MG/ML
INJECTION INTRAMUSCULAR; INTRAVENOUS
Status: DISCONTINUED | OUTPATIENT
Start: 2025-07-19 | End: 2025-07-19 | Stop reason: SDUPTHER

## 2025-07-19 RX ORDER — KETOROLAC TROMETHAMINE 30 MG/ML
30 INJECTION, SOLUTION INTRAMUSCULAR; INTRAVENOUS EVERY 6 HOURS
Status: COMPLETED | OUTPATIENT
Start: 2025-07-20 | End: 2025-07-20

## 2025-07-19 RX ORDER — MISOPROSTOL 200 UG/1
TABLET ORAL
Status: DISCONTINUED
Start: 2025-07-19 | End: 2025-07-19 | Stop reason: WASHOUT

## 2025-07-19 RX ORDER — SODIUM CHLORIDE 0.9 % (FLUSH) 0.9 %
5-40 SYRINGE (ML) INJECTION PRN
Status: DISCONTINUED | OUTPATIENT
Start: 2025-07-19 | End: 2025-07-21 | Stop reason: HOSPADM

## 2025-07-19 RX ORDER — SODIUM CHLORIDE 0.9 % (FLUSH) 0.9 %
5-40 SYRINGE (ML) INJECTION EVERY 12 HOURS SCHEDULED
Status: DISCONTINUED | OUTPATIENT
Start: 2025-07-19 | End: 2025-07-19

## 2025-07-19 RX ORDER — SODIUM CHLORIDE 9 MG/ML
INJECTION, SOLUTION INTRAVENOUS PRN
Status: DISCONTINUED | OUTPATIENT
Start: 2025-07-19 | End: 2025-07-21 | Stop reason: HOSPADM

## 2025-07-19 RX ORDER — SODIUM CHLORIDE 0.9 % (FLUSH) 0.9 %
5-40 SYRINGE (ML) INJECTION EVERY 12 HOURS SCHEDULED
Status: DISCONTINUED | OUTPATIENT
Start: 2025-07-19 | End: 2025-07-21 | Stop reason: HOSPADM

## 2025-07-19 RX ORDER — ONDANSETRON 2 MG/ML
4 INJECTION INTRAMUSCULAR; INTRAVENOUS EVERY 6 HOURS PRN
Status: DISCONTINUED | OUTPATIENT
Start: 2025-07-19 | End: 2025-07-21 | Stop reason: HOSPADM

## 2025-07-19 RX ORDER — SCOPOLAMINE 1 MG/3D
1 PATCH, EXTENDED RELEASE TRANSDERMAL
Status: DISCONTINUED | OUTPATIENT
Start: 2025-07-19 | End: 2025-07-19

## 2025-07-19 RX ORDER — IBUPROFEN 800 MG/1
800 TABLET, FILM COATED ORAL EVERY 8 HOURS
Status: DISCONTINUED | OUTPATIENT
Start: 2025-07-21 | End: 2025-07-21 | Stop reason: HOSPADM

## 2025-07-19 RX ORDER — TRANEXAMIC ACID 100 MG/ML
INJECTION, SOLUTION INTRAVENOUS
Status: DISCONTINUED
Start: 2025-07-19 | End: 2025-07-19 | Stop reason: WASHOUT

## 2025-07-19 RX ORDER — MODIFIED LANOLIN
OINTMENT (GRAM) TOPICAL
Status: DISCONTINUED | OUTPATIENT
Start: 2025-07-19 | End: 2025-07-21 | Stop reason: HOSPADM

## 2025-07-19 RX ORDER — FAMOTIDINE 10 MG/ML
INJECTION, SOLUTION INTRAVENOUS
Status: DISCONTINUED
Start: 2025-07-19 | End: 2025-07-19

## 2025-07-19 RX ORDER — DEXAMETHASONE SODIUM PHOSPHATE 10 MG/ML
INJECTION, SOLUTION INTRAMUSCULAR; INTRAVENOUS
Status: DISCONTINUED | OUTPATIENT
Start: 2025-07-19 | End: 2025-07-19 | Stop reason: SDUPTHER

## 2025-07-19 RX ORDER — OXYCODONE HYDROCHLORIDE 5 MG/1
5 TABLET ORAL EVERY 4 HOURS PRN
Status: DISCONTINUED | OUTPATIENT
Start: 2025-07-19 | End: 2025-07-20

## 2025-07-19 RX ORDER — LOPERAMIDE HYDROCHLORIDE 2 MG/1
2 CAPSULE ORAL PRN
Status: DISCONTINUED | OUTPATIENT
Start: 2025-07-19 | End: 2025-07-21 | Stop reason: HOSPADM

## 2025-07-19 RX ORDER — SODIUM CHLORIDE 9 MG/ML
INJECTION, SOLUTION INTRAVENOUS PRN
Status: DISCONTINUED | OUTPATIENT
Start: 2025-07-19 | End: 2025-07-19

## 2025-07-19 RX ORDER — MORPHINE SULFATE 1 MG/ML
INJECTION, SOLUTION EPIDURAL; INTRATHECAL; INTRAVENOUS
Status: DISCONTINUED | OUTPATIENT
Start: 2025-07-19 | End: 2025-07-19 | Stop reason: SDUPTHER

## 2025-07-19 RX ORDER — ONDANSETRON 2 MG/ML
4 INJECTION INTRAMUSCULAR; INTRAVENOUS EVERY 6 HOURS PRN
Status: DISCONTINUED | OUTPATIENT
Start: 2025-07-19 | End: 2025-07-19

## 2025-07-19 RX ORDER — METHYLERGONOVINE MALEATE 0.2 MG/ML
INJECTION INTRAVENOUS
Status: DISCONTINUED
Start: 2025-07-19 | End: 2025-07-19 | Stop reason: WASHOUT

## 2025-07-19 RX ORDER — EPHEDRINE SULFATE/0.9% NACL/PF 25 MG/5 ML
SYRINGE (ML) INTRAVENOUS
Status: DISCONTINUED | OUTPATIENT
Start: 2025-07-19 | End: 2025-07-19 | Stop reason: SDUPTHER

## 2025-07-19 RX ORDER — KETOROLAC TROMETHAMINE 30 MG/ML
INJECTION, SOLUTION INTRAMUSCULAR; INTRAVENOUS
Status: DISCONTINUED | OUTPATIENT
Start: 2025-07-19 | End: 2025-07-19 | Stop reason: SDUPTHER

## 2025-07-19 RX ORDER — OXYTOCIN 10 [USP'U]/ML
INJECTION, SOLUTION INTRAMUSCULAR; INTRAVENOUS
Status: DISCONTINUED
Start: 2025-07-19 | End: 2025-07-19 | Stop reason: WASHOUT

## 2025-07-19 RX ORDER — BISACODYL 10 MG
10 SUPPOSITORY, RECTAL RECTAL DAILY PRN
Status: DISCONTINUED | OUTPATIENT
Start: 2025-07-19 | End: 2025-07-21 | Stop reason: HOSPADM

## 2025-07-19 RX ORDER — SODIUM CHLORIDE, SODIUM LACTATE, POTASSIUM CHLORIDE, CALCIUM CHLORIDE 600; 310; 30; 20 MG/100ML; MG/100ML; MG/100ML; MG/100ML
INJECTION, SOLUTION INTRAVENOUS CONTINUOUS
Status: DISCONTINUED | OUTPATIENT
Start: 2025-07-19 | End: 2025-07-21 | Stop reason: HOSPADM

## 2025-07-19 RX ORDER — SODIUM CHLORIDE, SODIUM LACTATE, POTASSIUM CHLORIDE, CALCIUM CHLORIDE 600; 310; 30; 20 MG/100ML; MG/100ML; MG/100ML; MG/100ML
INJECTION, SOLUTION INTRAVENOUS
Status: COMPLETED
Start: 2025-07-19 | End: 2025-07-19

## 2025-07-19 RX ORDER — SODIUM CHLORIDE 9 MG/ML
INJECTION, SOLUTION INTRAVENOUS
Status: DISCONTINUED
Start: 2025-07-19 | End: 2025-07-19 | Stop reason: WASHOUT

## 2025-07-19 RX ORDER — SODIUM CHLORIDE, SODIUM LACTATE, POTASSIUM CHLORIDE, AND CALCIUM CHLORIDE .6; .31; .03; .02 G/100ML; G/100ML; G/100ML; G/100ML
1000 INJECTION, SOLUTION INTRAVENOUS ONCE
Status: DISCONTINUED | OUTPATIENT
Start: 2025-07-19 | End: 2025-07-19

## 2025-07-19 RX ORDER — CARBOPROST TROMETHAMINE 250 UG/ML
INJECTION, SOLUTION INTRAMUSCULAR
Status: DISCONTINUED
Start: 2025-07-19 | End: 2025-07-19 | Stop reason: WASHOUT

## 2025-07-19 RX ORDER — BUPIVACAINE HYDROCHLORIDE 7.5 MG/ML
INJECTION, SOLUTION INTRASPINAL
Status: DISCONTINUED | OUTPATIENT
Start: 2025-07-19 | End: 2025-07-19 | Stop reason: SDUPTHER

## 2025-07-19 RX ORDER — SODIUM CHLORIDE, SODIUM LACTATE, POTASSIUM CHLORIDE, CALCIUM CHLORIDE 600; 310; 30; 20 MG/100ML; MG/100ML; MG/100ML; MG/100ML
INJECTION, SOLUTION INTRAVENOUS CONTINUOUS
Status: DISCONTINUED | OUTPATIENT
Start: 2025-07-19 | End: 2025-07-19

## 2025-07-19 RX ORDER — DIPHENHYDRAMINE HYDROCHLORIDE 50 MG/ML
INJECTION, SOLUTION INTRAMUSCULAR; INTRAVENOUS
Status: DISCONTINUED | OUTPATIENT
Start: 2025-07-19 | End: 2025-07-19 | Stop reason: SDUPTHER

## 2025-07-19 RX ORDER — DOCUSATE SODIUM 100 MG/1
100 CAPSULE, LIQUID FILLED ORAL 2 TIMES DAILY
Status: DISCONTINUED | OUTPATIENT
Start: 2025-07-19 | End: 2025-07-21 | Stop reason: HOSPADM

## 2025-07-19 RX ADMIN — Medication 999 ML/HR: at 18:11

## 2025-07-19 RX ADMIN — FAMOTIDINE 20 MG: 10 INJECTION, SOLUTION INTRAVENOUS at 18:11

## 2025-07-19 RX ADMIN — SODIUM CHLORIDE, SODIUM LACTATE, POTASSIUM CHLORIDE, AND CALCIUM CHLORIDE: .6; .31; .03; .02 INJECTION, SOLUTION INTRAVENOUS at 18:18

## 2025-07-19 RX ADMIN — CEFAZOLIN 2000 MG: 2 INJECTION, POWDER, FOR SOLUTION INTRAMUSCULAR; INTRAVENOUS at 18:01

## 2025-07-19 RX ADMIN — KETOROLAC TROMETHAMINE 30 MG: 30 INJECTION, SOLUTION INTRAMUSCULAR at 18:27

## 2025-07-19 RX ADMIN — AZITHROMYCIN MONOHYDRATE 500 MG: 500 INJECTION, POWDER, LYOPHILIZED, FOR SOLUTION INTRAVENOUS at 18:04

## 2025-07-19 RX ADMIN — SODIUM CHLORIDE, SODIUM LACTATE, POTASSIUM CHLORIDE, AND CALCIUM CHLORIDE: .6; .31; .03; .02 INJECTION, SOLUTION INTRAVENOUS at 17:50

## 2025-07-19 RX ADMIN — DOCUSATE SODIUM 100 MG: 100 CAPSULE, LIQUID FILLED ORAL at 22:44

## 2025-07-19 RX ADMIN — ONDANSETRON 4 MG: 2 INJECTION, SOLUTION INTRAMUSCULAR; INTRAVENOUS at 18:13

## 2025-07-19 RX ADMIN — BUPIVACAINE HYDROCHLORIDE IN DEXTROSE 1.6 ML: 7.5 INJECTION, SOLUTION SUBARACHNOID at 17:57

## 2025-07-19 RX ADMIN — EPHEDRINE SULFATE 25 MG: 5 INJECTION INTRAVENOUS at 18:19

## 2025-07-19 RX ADMIN — MORPHINE SULFATE 0.15 MG: 1 INJECTION EPIDURAL; INTRATHECAL; INTRAVENOUS at 17:57

## 2025-07-19 RX ADMIN — SODIUM CHLORIDE, SODIUM LACTATE, POTASSIUM CHLORIDE, AND CALCIUM CHLORIDE: .6; .31; .03; .02 INJECTION, SOLUTION INTRAVENOUS at 20:00

## 2025-07-19 RX ADMIN — DEXAMETHASONE SODIUM PHOSPHATE 10 MG: 10 INJECTION, SOLUTION INTRAMUSCULAR; INTRAVENOUS at 18:13

## 2025-07-19 RX ADMIN — DIPHENHYDRAMINE HYDROCHLORIDE 12.5 MG: 50 INJECTION INTRAMUSCULAR; INTRAVENOUS at 18:14

## 2025-07-19 NOTE — H&P
Department of Obstetrics and Gynecology   History & Physical    Pt Name: Varsha Jackson  MRN: [id number] Acct #: [id number]  YOB: 1990  Estimated Date of Delivery: 25      HPI: The patient is a 35 y.o.  female  who presents having regular painful contractions every 2-3 minutes with late decelerations. Variability is moderate. Pt is scheduled for a repeat  section for .     Allergies:    Allergies as of 07/15/2025    (No Known Allergies)       Medications:    Current Facility-Administered Medications:     lactated ringers infusion, , , ,     lactated ringers infusion, , IntraVENous, Continuous, Lacho Lawrence,     lactated ringers bolus 1,000 mL, 1,000 mL, IntraVENous, Once, Lacho Lawrence,     sodium chloride flush 0.9 % injection 5-40 mL, 5-40 mL, IntraVENous, 2 times per day, Lacho Lawrence,     sodium chloride flush 0.9 % injection 10 mL, 10 mL, IntraVENous, PRN, Lacho Lawrence,     0.9 % sodium chloride infusion, , IntraVENous, PRN, Lacho Lawrence,     acetaminophen (TYLENOL) tablet 975 mg, 975 mg, Oral, Once, Lacho Lawrence DO    oxytocin (PITOCIN) 30 units in 500 mL infusion, 87.3 edgar-units/min, IntraVENous, Continuous PRN **AND** oxytocin (PITOCIN) 10 unit bolus from the bag, 10 Units, IntraVENous, PRN, Lacho Lawrence,     ondansetron (ZOFRAN) injection 4 mg, 4 mg, IntraVENous, Q6H PRN, Lacho Lawrence,     ceFAZolin (ANCEF) 2,000 mg in sterile water 20 mL IV syringe, 2,000 mg, IntraVENous, Once, Lacho Lawrence,     azithromycin (ZITHROMAX) 500 mg in sodium chloride 0.9 % 250 mL (addEASE) IVPB, 500 mg, IntraVENous, Q24H, Lacho Lawrence,     scopolamine (TRANSDERM-SCOP) transdermal patch 1 patch, 1 patch, TransDERmal, Q72H, Lacho Lawrence, DO    OB History:     Gyn History: Denies h/o abnormal pap smear, h/o STDs.     Past Medical History: History reviewed. No pertinent past medical history.    Past Surgical

## 2025-07-19 NOTE — ANESTHESIA POSTPROCEDURE EVALUATION
Department of Anesthesiology  Postprocedure Note    Patient: Varsha Jackson  MRN: [id number]  YOB: 1990  Date of evaluation: 2025    Procedure Summary       Date: 25 Room / Location:     Anesthesia Start: 1750 Anesthesia Stop: 184    Procedure:  SECTION Diagnosis:       Normal labor and delivery      (Normal labor and delivery [O80])    Surgeons: Lacho Lawrence DO Responsible Provider: Margot Sprague APRN - CRNA    Anesthesia Type: spinal ASA Status: 2            Anesthesia Type: No value filed.    Tamara Phase I:      Tamara Phase II:      Anesthesia Post Evaluation    Patient location during evaluation: bedside  Patient participation: complete - patient participated  Level of consciousness: awake and awake and alert  Pain score: 4  Airway patency: patent  Nausea & Vomiting: no nausea and no vomiting  Cardiovascular status: blood pressure returned to baseline and hemodynamically stable  Respiratory status: acceptable  Hydration status: euvolemic  Comments: Pt having abdominal cramping.   Pain management: adequate        No notable events documented.

## 2025-07-19 NOTE — OP NOTE
subcutaneous layer was closed with 3-0 Vicryl. The skin  edge was reapproximated with Insorb staples.The incision was cleansed and appropriately dressed. The uterus was cleared of any clots and irrigating fluid.    Instrument, sponge, and needle counts were correct prior the abdominal closure and at the conclusion of the case.     Findings:  Anterior and fundal placenta with large amount of clotting behind the placenta in the fundal portion.  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  Baby was delivered there was a cord around the arm and it was looped in front of the baby's chest but was not nuchal cord.  Viable female.  She weighed 7 pounds 2.8 ounces and her Apgars were 9 and 9    EBL: 500 cc         Drains: Griffith                Specimens: Placenta                    Complications: none    Disposition: to recovery room in stable condition           Condition: stable        Lacho Lawrence DO  July 19, 2025

## 2025-07-19 NOTE — ANESTHESIA PRE PROCEDURE
History reviewed. No pertinent past medical history.    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       Social History:    Social History     Tobacco Use    Smoking status: Never    Smokeless tobacco: Never   Substance Use Topics    Alcohol use: Never                                Counseling given: Not Answered      Vital Signs (Current):   Vitals:    25 1657 25 1700   BP: (!) 137/91 (!) 143/81   Pulse: 85 92   Temp: 36.8 °C (98.2 °F)    TempSrc: Oral    SpO2: 97%                                               BP Readings from Last 3 Encounters:   25 (!) 143/81   25 130/80   25 118/64       NPO Status:                                                                                 BMI:   Wt Readings from Last 3 Encounters:   25 105.7 kg (233 lb)   25 98.4 kg (217 lb)   25 96.2 kg (212 lb)     There is no height or weight on file to calculate BMI.    CBC:   Lab Results   Component Value Date/Time    WBC 9.9 2025 05:29 PM    RBC 3.19 2025 05:29 PM    HGB 10.6 2025 05:29 PM    HCT 32.0 2025 05:29 PM    .3 2025 05:29 PM    RDW 12.6 2025 05:29 PM     2025 05:29 PM       CMP:   Lab Results   Component Value Date/Time     2021 03:15 PM    K 3.9 2021 03:15 PM     2021 03:15 PM    CO2 22 2021 03:15 PM    BUN 7 2021 03:15 PM    CREATININE 0.56 2021 03:15 PM    GFRAA >60.0 2021 03:15 PM    LABGLOM >60.0 2021 03:15 PM    GLUCOSE 85 2021 03:15 PM    CALCIUM 8.7 2021 03:15 PM    BILITOT 0.9 2021 03:15 PM    ALKPHOS 213 2021 03:15 PM    AST 28 2021 03:15 PM    ALT 7 2021 03:15 PM       POC Tests: No results for input(s): \"POCGLU\", \"POCNA\", \"POCK\",

## 2025-07-20 LAB
ERYTHROCYTE [DISTWIDTH] IN BLOOD BY AUTOMATED COUNT: 12.7 % (ref 11.5–14.5)
GP B STREP SPEC QL CULT: NORMAL
HCT VFR BLD AUTO: 26.4 % (ref 37–47)
HGB BLD-MCNC: 8.9 G/DL (ref 12–16)
HIV AG/AB: NONREACTIVE
MCH RBC QN AUTO: 33.3 PG (ref 27–31.3)
MCHC RBC AUTO-ENTMCNC: 33.7 % (ref 33–37)
MCV RBC AUTO: 98.9 FL (ref 79.4–94.8)
PLATELET # BLD AUTO: 206 K/UL (ref 130–400)
RBC # BLD AUTO: 2.67 M/UL (ref 4.2–5.4)
WBC # BLD AUTO: 15.4 K/UL (ref 4.8–10.8)

## 2025-07-20 PROCEDURE — 6370000000 HC RX 637 (ALT 250 FOR IP): Performed by: OBSTETRICS & GYNECOLOGY

## 2025-07-20 PROCEDURE — 1220000000 HC SEMI PRIVATE OB R&B

## 2025-07-20 PROCEDURE — 6360000002 HC RX W HCPCS: Performed by: OBSTETRICS & GYNECOLOGY

## 2025-07-20 PROCEDURE — 85027 COMPLETE CBC AUTOMATED: CPT

## 2025-07-20 RX ORDER — OXYCODONE HYDROCHLORIDE 5 MG/1
10 TABLET ORAL EVERY 4 HOURS PRN
Refills: 0 | Status: DISCONTINUED | OUTPATIENT
Start: 2025-07-20 | End: 2025-07-21 | Stop reason: HOSPADM

## 2025-07-20 RX ADMIN — OXYCODONE 5 MG: 5 TABLET ORAL at 03:52

## 2025-07-20 RX ADMIN — OXYCODONE 5 MG: 5 TABLET ORAL at 20:41

## 2025-07-20 RX ADMIN — KETOROLAC TROMETHAMINE 30 MG: 30 INJECTION, SOLUTION INTRAMUSCULAR at 00:24

## 2025-07-20 RX ADMIN — OXYCODONE 5 MG: 5 TABLET ORAL at 16:40

## 2025-07-20 RX ADMIN — KETOROLAC TROMETHAMINE 30 MG: 30 INJECTION, SOLUTION INTRAMUSCULAR at 17:39

## 2025-07-20 RX ADMIN — KETOROLAC TROMETHAMINE 30 MG: 30 INJECTION, SOLUTION INTRAMUSCULAR at 12:29

## 2025-07-20 RX ADMIN — OXYCODONE 5 MG: 5 TABLET ORAL at 09:20

## 2025-07-20 RX ADMIN — DOCUSATE SODIUM 100 MG: 100 CAPSULE, LIQUID FILLED ORAL at 20:42

## 2025-07-20 RX ADMIN — KETOROLAC TROMETHAMINE 30 MG: 30 INJECTION, SOLUTION INTRAMUSCULAR at 06:39

## 2025-07-20 RX ADMIN — DOCUSATE SODIUM 100 MG: 100 CAPSULE, LIQUID FILLED ORAL at 08:20

## 2025-07-20 RX ADMIN — IBUPROFEN 800 MG: 800 TABLET, FILM COATED ORAL at 23:18

## 2025-07-20 ASSESSMENT — PAIN SCALES - GENERAL
PAINLEVEL_OUTOF10: 6
PAINLEVEL_OUTOF10: 4
PAINLEVEL_OUTOF10: 6
PAINLEVEL_OUTOF10: 5
PAINLEVEL_OUTOF10: 7
PAINLEVEL_OUTOF10: 6

## 2025-07-20 ASSESSMENT — PAIN DESCRIPTION - DESCRIPTORS
DESCRIPTORS: ACHING;PRESSURE
DESCRIPTORS: PRESSURE
DESCRIPTORS: ACHING;SORE;PRESSURE
DESCRIPTORS: ACHING;PRESSURE

## 2025-07-20 ASSESSMENT — PAIN DESCRIPTION - LOCATION
LOCATION: ABDOMEN;INCISION
LOCATION: ABDOMEN;INCISION
LOCATION: INCISION
LOCATION: INCISION

## 2025-07-20 NOTE — FLOWSHEET NOTE
RN bedside leo taken out at this time. RN encouraging pt to sit in the chair or dangle her feet on the side of the bed. Pt states \"I am still feeling tired and I am in pain, can I have a little more time\". RN encourages pt to call when she is ready to sit in the chair.

## 2025-07-20 NOTE — PLAN OF CARE
Problem: Vaginal Birth or  Section  Goal: Fetal and maternal status remain reassuring during the birth process  Description:  Birth OB-Pregnancy care plan goal which identifies if the fetal and maternal status remain reassuring during the birth process  2025 0144 by Ayleen Smith RN  Outcome: Progressing  2025 172 by Alyssia Deleon RN  Outcome: Progressing     Problem: Pain  Goal: Verbalizes/displays adequate comfort level or baseline comfort level  2025 014 by Ayleen Smith RN  Outcome: Progressing  2025 1722 by Alyssia Deleon RN  Outcome: Progressing     Problem: Infection - Adult  Goal: Absence of infection at discharge  2025 014 by Ayleen Smith RN  Outcome: Progressing  2025 172 by Alyssia Deleon RN  Outcome: Progressing  Goal: Absence of infection during hospitalization  2025 014 by Ayleen Smith RN  Outcome: Progressing  2025 172 by Alyssia Deleon RN  Outcome: Progressing  Goal: Absence of fever/infection during anticipated neutropenic period  2025 014 by Ayleen Smith RN  Outcome: Progressing  2025 172 by Alyssia Deleon RN  Outcome: Progressing     Problem: Safety - Adult  Goal: Free from fall injury  2025 014 by Ayleen Smith RN  Outcome: Progressing  2025 172 by Alyssia Deleon RN  Outcome: Progressing     Problem: Discharge Planning  Goal: Discharge to home or other facility with appropriate resources  2025 014 by Ayleen Smith RN  Outcome: Progressing  2025 1722 by Alyssia Deleon RN  Outcome: Progressing

## 2025-07-20 NOTE — FLOWSHEET NOTE
Pt called this RN to bedside for assistance getting back in bed. Pt ambulated with minimal assistance.

## 2025-07-20 NOTE — PLAN OF CARE
Problem: Pain  Goal: Verbalizes/displays adequate comfort level or baseline comfort level  2025 by Puneet Castro RN  Outcome: Progressing  2025 by Ayleen Smith RN  Outcome: Progressing     Problem: Infection - Adult  Goal: Absence of infection at discharge  2025 by Puneet Castro RN  Outcome: Progressing  2025 by Ayleen Smith RN  Outcome: Progressing  Goal: Absence of infection during hospitalization  2025 by Puneet Castro RN  Outcome: Progressing  2025 by Ayleen Smith RN  Outcome: Progressing  Goal: Absence of fever/infection during anticipated neutropenic period  2025 by Puneet Castro RN  Outcome: Progressing  2025 by Ayleen Smith RN  Outcome: Progressing     Problem: Safety - Adult  Goal: Free from fall injury  2025 by Puneet Castro RN  Outcome: Progressing  2025 by Ayleen Smith RN  Outcome: Progressing     Problem: Discharge Planning  Goal: Discharge to home or other facility with appropriate resources  2025 by Puneet Castro RN  Outcome: Progressing  2025 by Ayleen Smith RN  Outcome: Progressing     Problem: Postpartum  Goal: Experiences normal postpartum course  Description:  Postpartum OB-Pregnancy care plan goal which identifies if the mother is experiencing a normal postpartum course  Outcome: Progressing  Goal: Appropriate maternal -  bonding  Description:  Postpartum OB-Pregnancy care plan goal which identifies if the mother and  are bonding appropriately  Outcome: Progressing  Goal: Establishment of infant feeding pattern  Description:  Postpartum OB-Pregnancy care plan goal which identifies if the mother is establishing a feeding pattern with their   Outcome: Progressing  Goal: Incisions, wounds, or drain sites healing without S/S of infection  Outcome: Progressing

## 2025-07-20 NOTE — FLOWSHEET NOTE
Pt called this RN to the bedside to assist with transferring to chair. Pt denies feeling lightheaded or dizzy and states \"I have full feeling back in my legs\". Pt ambulated with minimal assistance.

## 2025-07-20 NOTE — FLOWSHEET NOTE
RN bedside blood pressure cuff provided and education/log paper provided to pt. Pt educated on taking blood pressures at home and scheduling a follow up visit within 10 days of discharge.

## 2025-07-21 VITALS
RESPIRATION RATE: 16 BRPM | OXYGEN SATURATION: 99 % | DIASTOLIC BLOOD PRESSURE: 73 MMHG | SYSTOLIC BLOOD PRESSURE: 120 MMHG | HEART RATE: 88 BPM | TEMPERATURE: 97.6 F

## 2025-07-21 PROCEDURE — 6370000000 HC RX 637 (ALT 250 FOR IP): Performed by: OBSTETRICS & GYNECOLOGY

## 2025-07-21 PROCEDURE — 99024 POSTOP FOLLOW-UP VISIT: CPT | Performed by: OBSTETRICS & GYNECOLOGY

## 2025-07-21 RX ORDER — SERTRALINE HYDROCHLORIDE 25 MG/1
25 TABLET, FILM COATED ORAL DAILY
Qty: 30 TABLET | Refills: 0 | Status: SHIPPED | OUTPATIENT
Start: 2025-07-21 | End: 2025-08-20

## 2025-07-21 RX ORDER — SERTRALINE HYDROCHLORIDE 25 MG/1
25 TABLET, FILM COATED ORAL DAILY
Status: DISCONTINUED | OUTPATIENT
Start: 2025-07-21 | End: 2025-07-21 | Stop reason: HOSPADM

## 2025-07-21 RX ORDER — SERTRALINE HYDROCHLORIDE 25 MG/1
25 TABLET, FILM COATED ORAL DAILY
Qty: 30 TABLET | Refills: 3 | Status: SHIPPED | OUTPATIENT
Start: 2025-07-21 | End: 2025-07-21

## 2025-07-21 RX ORDER — IBUPROFEN 800 MG/1
800 TABLET, FILM COATED ORAL EVERY 8 HOURS
Qty: 30 TABLET | Refills: 0 | Status: SHIPPED | OUTPATIENT
Start: 2025-07-21 | End: 2025-07-31

## 2025-07-21 RX ORDER — OXYCODONE HYDROCHLORIDE 5 MG/1
5 TABLET ORAL EVERY 6 HOURS PRN
Qty: 20 TABLET | Refills: 0 | Status: SHIPPED | OUTPATIENT
Start: 2025-07-21 | End: 2025-07-26

## 2025-07-21 RX ORDER — PSEUDOEPHEDRINE HCL 30 MG
100 TABLET ORAL 2 TIMES DAILY
Qty: 30 CAPSULE | Refills: 0 | Status: SHIPPED | OUTPATIENT
Start: 2025-07-21 | End: 2025-08-05

## 2025-07-21 RX ADMIN — IBUPROFEN 800 MG: 800 TABLET, FILM COATED ORAL at 07:36

## 2025-07-21 RX ADMIN — DOCUSATE SODIUM 100 MG: 100 CAPSULE, LIQUID FILLED ORAL at 09:40

## 2025-07-21 RX ADMIN — IBUPROFEN 800 MG: 800 TABLET, FILM COATED ORAL at 09:40

## 2025-07-21 RX ADMIN — OXYCODONE 10 MG: 5 TABLET ORAL at 05:49

## 2025-07-21 ASSESSMENT — PAIN SCALES - GENERAL
PAINLEVEL_OUTOF10: 5
PAINLEVEL_OUTOF10: 5

## 2025-07-21 NOTE — PLAN OF CARE
Problem: Pain  Goal: Verbalizes/displays adequate comfort level or baseline comfort level  Outcome: Progressing     Problem: Infection - Adult  Goal: Absence of infection at discharge  Outcome: Progressing  Goal: Absence of infection during hospitalization  Outcome: Progressing  Goal: Absence of fever/infection during anticipated neutropenic period  Outcome: Progressing     Problem: Safety - Adult  Goal: Free from fall injury  Outcome: Progressing     Problem: Discharge Planning  Goal: Discharge to home or other facility with appropriate resources  Outcome: Progressing     Problem: Postpartum  Goal: Experiences normal postpartum course  Description:  Postpartum OB-Pregnancy care plan goal which identifies if the mother is experiencing a normal postpartum course  Outcome: Progressing  Goal: Appropriate maternal -  bonding  Description:  Postpartum OB-Pregnancy care plan goal which identifies if the mother and  are bonding appropriately  Outcome: Progressing  Goal: Establishment of infant feeding pattern  Description:  Postpartum OB-Pregnancy care plan goal which identifies if the mother is establishing a feeding pattern with their   Outcome: Progressing  Goal: Incisions, wounds, or drain sites healing without S/S of infection  Outcome: Progressing

## 2025-07-21 NOTE — CARE COORDINATION
Reason for consult: Limited pre-jasmyn care and positive for Barbiturate on 2025; have Rx for Fioricet.   Baby Girl: Lion Ronquillo   : [birth year]25  FOB: Den Ronquillo   Address: [address] [address]  Contact: [phone]  Baby #[phone]     LSW met with pt at bedside. Completed assessment with pt. Pt lives at home with FOB and her 5 kids. Have everything at home for baby. Clothing, diapers, formula, crib and car seat. Pt is currently connected with [facility]. Friends and family is able to help out with baby if need it.   Pt denies any transportation or financial issues at this time.     Discussed limited prenatal care with pt. Pt report she did not find out she was pregnant until 21 weeks.   Discussed Positive Barbiturate, pt report she have RX for Fioricet. For her headache.   Confirmed with staff.     Pt was appropriate with baby during time of assessment. Per staff report, pt is been very appropriate. No concerns at this time.     Pt is able to go home with baby at the time of DC.     Updated Staff.     Electronically signed by DEVORAH Bermudez on 2025 at 11:26 AM

## 2025-07-21 NOTE — DISCHARGE SUMMARY
Discharge Summary    Varsha Jackson  :  1990  MRN:  [id number]    ADMIT DATE:  2025  DISCHARGE DATE:  2025    PRIMARY CARE PHYSICIAN:  Lacho Lawrence DO    VISIT STATUS: Admission    CODE STATUS:  Prior    DISCHARGE DIAGNOSES:  Principal Problem:    Delivery of pregnancy by  section  Active Problems:    Non-reassuring fetal heart rate with late deceleration  Resolved Problems:    * No resolved hospital problems. *      HOSPITAL COURSE:  Patient presented in spontaneous labor and was admitted for repeat LTCS, and all appropriate consents were signed. All of patient's questions were answered and she expressed understanding. Decision was made to proceed with repeat LTCS, after R/B/A as well as SE were reviewed. Repeat LTCS was performed without difficulty and she was delivered of viable female infant 2025 @ 1809, (38.1 weeks) weighing 7 lbs, 2 oz, (3256 grams) with Apgars of 9/9. Please refer to operative notes for further details. She was received to postop floor in stable condition. Postpartum course was complicated by elevated Bps, but remained stable, not requiring medicinal management. She was sent home with BB cuff and appointment for BP check in office within 10 days. Pre-Eclampsia precautions reviewed prior to discharge including instructions, medications and follow up with Dr. Lawrence in 1 week for incision check and BP check.   RECOMMENDED NEXT STEPS:   Discharge Home      DISCHARGE MEDICATIONS:         Medication List        START taking these medications      docusate 100 MG Caps  Commonly known as: COLACE, DULCOLAX  Take 100 mg by mouth 2 times daily for 15 days     * ibuprofen 800 MG tablet  Commonly known as: ADVIL;MOTRIN  Take 1 tablet by mouth in the morning and 1 tablet at noon and 1 tablet in the evening. Do all this for 10 days.     oxyCODONE 5 MG immediate release tablet  Commonly known as: ROXICODONE  Take 1 tablet by mouth every 6 hours as needed for Pain for up

## 2025-07-21 NOTE — PROGRESS NOTES
Postpartum [place]  Depression Scale    In to discuss patient's score of 14    Patient has history of Depression postpartum   She has reports taking the following medications in the past: none  She states mentioning it several times during pregnancy.     She is attentive with baby and displaying appropriate behavior and moods consistent for recent delivery.   She is able to Contract for Safety for herself and her baby.   She reports great support system.     She desires Treatment at this time.   Will start Zoloft 25 mg Daily after R/B/A as well as SE were reviewed with patient.   Script sent to pharmacy.   She states that she will reach out with provider if she has concerns.   All of patient's questions were answered and she expressed understanding.   Will continue to follow while inpatient.   
Pt to L&D with reports of abdominal pain. This began at 0500 today. Pt denies vaginal bleeding, loss of fluid. Pt reports fetal movement. EFM applied, FHT's 140bpm .  
appropriate   Uterine Fundus:   firm   Incision:  healing well, no significant drainage, no dehiscence, no significant erythema   DVT Evaluation:  No evidence of DVT seen on physical exam.         Assessment:     Status post  section. Doing well postoperatively.     Plan:   1.Routine post op care    Continue current care.    Lacho Lawrence DO

## 2025-07-21 NOTE — PLAN OF CARE
Problem: Pain  Goal: Verbalizes/displays adequate comfort level or baseline comfort level  2025 by Alyx Oconnell RN  Outcome: Progressing  2025 by Ayleen Smith RN  Outcome: Progressing     Problem: Infection - Adult  Goal: Absence of infection at discharge  2025 by Alyx Oconnell RN  Outcome: Progressing  2025 by Ayleen Smith RN  Outcome: Progressing  Goal: Absence of infection during hospitalization  2025 by Alyx Oconnell RN  Outcome: Progressing  2025 by Ayleen Smith RN  Outcome: Progressing  Goal: Absence of fever/infection during anticipated neutropenic period  2025 by Alyx Oconnell RN  Outcome: Progressing  2025 by Ayleen Smith RN  Outcome: Progressing     Problem: Safety - Adult  Goal: Free from fall injury  2025 by Alyx Oconnell RN  Outcome: Progressing  2025 by Ayleen Smith RN  Outcome: Progressing     Problem: Discharge Planning  Goal: Discharge to home or other facility with appropriate resources  2025 by Alyx Oconnell RN  Outcome: Progressing  2025 by Ayleen Smith RN  Outcome: Progressing     Problem: Postpartum  Goal: Experiences normal postpartum course  Description:  Postpartum OB-Pregnancy care plan goal which identifies if the mother is experiencing a normal postpartum course  2025 by Alyx Oconnell RN  Outcome: Progressing  2025 by Ayleen Smith RN  Outcome: Progressing  Goal: Appropriate maternal -  bonding  Description:  Postpartum OB-Pregnancy care plan goal which identifies if the mother and  are bonding appropriately  2025 by Alyx Oconnell RN  Outcome: Progressing  2025 by Ayleen Smith RN  Outcome: Progressing  Goal: Establishment of infant feeding pattern  Description:  Postpartum OB-Pregnancy care plan goal which identifies if the mother is establishing a feeding

## 2025-07-24 ENCOUNTER — OFFICE VISIT (OUTPATIENT)
Dept: OBGYN CLINIC | Age: 35
End: 2025-07-24

## 2025-07-24 VITALS
DIASTOLIC BLOOD PRESSURE: 84 MMHG | WEIGHT: 217.8 LBS | SYSTOLIC BLOOD PRESSURE: 134 MMHG | HEART RATE: 104 BPM | BODY MASS INDEX: 33.12 KG/M2

## 2025-07-24 PROCEDURE — 99024 POSTOP FOLLOW-UP VISIT: CPT | Performed by: OBSTETRICS & GYNECOLOGY

## 2025-07-24 ASSESSMENT — ENCOUNTER SYMPTOMS
CHEST TIGHTNESS: 0
WHEEZING: 0
BACK PAIN: 0
VOICE CHANGE: 0
CONSTIPATION: 0
SHORTNESS OF BREATH: 0
NAUSEA: 0
ABDOMINAL PAIN: 0
TROUBLE SWALLOWING: 0
COUGH: 0
BLOOD IN STOOL: 0
COLOR CHANGE: 0
SORE THROAT: 0
VOMITING: 0
ABDOMINAL DISTENTION: 0

## 2025-07-24 NOTE — PROGRESS NOTES
HPI:  Varsha Jackson (: 1990) is a 35 y.o. female, Established patient, here for evaluation of the following chief complaint(s):  Postpartum Care (Blood pressure check)  Patient is here for a blood pressure check following a repeat  section.  She had a placental abruption.  Her blood pressures were slightly elevated in the hospital.  She was not discharged on any medication.  Her blood pressure today initially was 146/88 and on recheck it was 134/84.  Patient has no headaches no visual field changes or right upper quadrant pain.  She is doing well pain wise.  Her bleeding is white.  Baby is doing excellent as well.      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   HENT:  Negative for dental problem, ear pain, hearing loss, nosebleeds, sore throat, trouble swallowing and voice change.    Eyes:  Negative for visual disturbance.   Respiratory:  Negative for cough, chest tightness, shortness of breath and wheezing.    Cardiovascular:  Negative for chest pain and palpitations.   Gastrointestinal:  Negative for abdominal distention, abdominal pain, blood in stool, constipation, nausea and vomiting.   Endocrine: Negative for cold intolerance, heat intolerance, polydipsia, polyphagia and polyuria.   Genitourinary:  Negative for difficulty urinating, dyspareunia, dysuria, flank pain, frequency, genital sores, hematuria, menstrual problem, pelvic pain, urgency, vaginal bleeding, vaginal discharge and vaginal pain.   Musculoskeletal:  Negative for arthralgias, back pain, joint

## (undated) DEVICE — SUTURE VCRL SZ 3-0 L36IN ABSRB VLT CT L40MM 1/2 CIR J356H

## (undated) DEVICE — DRESSING TELFA STRL 3X6

## (undated) DEVICE — STERILE NEOPRENE POWDER-FREE SURGICAL GLOVES WITH NITRILE COATING: Brand: PROTEXIS

## (undated) DEVICE — SUTURE VICRYL SZ 1 L36IN ABSRB UD L36MM CT-1 1/2 CIR J947H

## (undated) DEVICE — ASTOUND STANDARD SURGICAL GOWN, XL: Brand: CONVERTORS

## (undated) DEVICE — SUTURE VICRYL SZ 0 L36IN ABSRB VLT L36MM CT-1 1/2 CIR J346H

## (undated) DEVICE — SUTURE VCRL SZ 0 L36IN ABSRB VLT L36MM CT-1 1/2 CIR J346H

## (undated) DEVICE — SUTURE VCRL SZ 1 L36IN ABSRB UD L36MM CT-1 1/2 CIR J947H

## (undated) DEVICE — SUTURE VICRYL SZ 4-0 L27IN ABSRB UD L60MM KS STR REV CUT NDL J662H

## (undated) DEVICE — SYRINGE BULB EAR ULCER LF ST 1 OZ MEDICHOICE

## (undated) DEVICE — STAPLER SKIN SQ 30 ABSRB STPL DISP INSORB ORDER VIA PHONE OR EMAIL

## (undated) DEVICE — STAPLER SKIN SQ 30 ABSRB STPL DISP INSORB

## (undated) DEVICE — SUTURE VCRL SZ 4-0 L27IN ABSRB UD L60MM KS STR REV CUT NDL J662H